# Patient Record
Sex: MALE | Race: WHITE | Employment: OTHER | URBAN - METROPOLITAN AREA
[De-identification: names, ages, dates, MRNs, and addresses within clinical notes are randomized per-mention and may not be internally consistent; named-entity substitution may affect disease eponyms.]

---

## 2018-08-02 NOTE — PAT NURSING NOTE
RN started the health screening with pt, and when asking about medications, pt reports being in the hospital and not quite sure what he is taking.  RN stopped the call and told pt she will call him Monday at [facility] 63 is supposed to be released tomorrow, so t

## 2018-08-06 RX ORDER — METOPROLOL SUCCINATE 25 MG/1
25 TABLET, EXTENDED RELEASE ORAL 2 TIMES DAILY
Status: ON HOLD | COMMUNITY
End: 2018-08-16

## 2018-08-06 RX ORDER — LOPERAMIDE HYDROCHLORIDE 2 MG/1
2 TABLET ORAL 2 TIMES DAILY PRN
COMMUNITY
End: 2018-10-03 | Stop reason: CLARIF

## 2018-08-06 RX ORDER — LISINOPRIL 40 MG/1
40 TABLET ORAL DAILY
COMMUNITY

## 2018-08-06 RX ORDER — AMITRIPTYLINE HYDROCHLORIDE 25 MG/1
25 TABLET, FILM COATED ORAL 2 TIMES DAILY
COMMUNITY

## 2018-08-06 RX ORDER — FLECAINIDE ACETATE 50 MG/1
50 TABLET ORAL 2 TIMES DAILY
Status: ON HOLD | COMMUNITY
End: 2018-08-16

## 2018-08-06 RX ORDER — AMLODIPINE BESYLATE 5 MG/1
5 TABLET ORAL NIGHTLY
COMMUNITY

## 2018-08-06 RX ORDER — TRAMADOL HYDROCHLORIDE 50 MG/1
100 TABLET ORAL 3 TIMES DAILY
Status: ON HOLD | COMMUNITY
End: 2018-08-16

## 2018-08-06 RX ORDER — BACLOFEN 10 MG/1
10 TABLET ORAL DAILY
COMMUNITY

## 2018-08-06 RX ORDER — HYDROCODONE BITARTRATE AND ACETAMINOPHEN 5; 325 MG/1; MG/1
1 TABLET ORAL EVERY 4 HOURS PRN
Status: ON HOLD | COMMUNITY
End: 2018-08-16

## 2018-08-06 RX ORDER — MELATONIN
325
Status: ON HOLD | COMMUNITY
End: 2018-08-18

## 2018-08-06 RX ORDER — CEPHALEXIN 500 MG/1
500 TABLET ORAL 2 TIMES DAILY
Status: ON HOLD | COMMUNITY
End: 2018-08-20

## 2018-08-14 RX ORDER — CEFAZOLIN SODIUM/WATER 2 G/20 ML
2 SYRINGE (ML) INTRAVENOUS ONCE
Status: CANCELLED | OUTPATIENT
Start: 2018-08-14 | End: 2018-08-14

## 2018-08-15 ENCOUNTER — ANESTHESIA EVENT (OUTPATIENT)
Dept: SURGERY | Facility: HOSPITAL | Age: 63
End: 2018-08-15

## 2018-08-15 PROBLEM — T84.59XA INFECTED PROSTHETIC HIP (HCC): Status: ACTIVE | Noted: 2018-08-15

## 2018-08-15 PROBLEM — Z96.649 INFECTED PROSTHETIC HIP (HCC): Status: ACTIVE | Noted: 2018-08-15

## 2018-08-15 NOTE — DISCHARGE SUMMARY
Ortho Discharge Summary     Patient ID:  Kaylee Hatchet  [id number]  70year old  6/28/1955      Admit Date: 8/16/2018    Discharge Date and Time: 8/20/2018    Attending Physician: Yokasta Azevedo MD     Reason for admission: right hip infection    Dischar

## 2018-08-16 ENCOUNTER — APPOINTMENT (OUTPATIENT)
Dept: GENERAL RADIOLOGY | Facility: HOSPITAL | Age: 63
DRG: 465 | End: 2018-08-16
Attending: PHYSICIAN ASSISTANT
Payer: MEDICARE

## 2018-08-16 ENCOUNTER — ANESTHESIA (OUTPATIENT)
Dept: SURGERY | Facility: HOSPITAL | Age: 63
End: 2018-08-16

## 2018-08-16 ENCOUNTER — HOSPITAL ENCOUNTER (INPATIENT)
Facility: HOSPITAL | Age: 63
LOS: 4 days | Discharge: NURSING, REHAB OR LONG-TERM CARE | DRG: 465 | End: 2018-08-20
Attending: ORTHOPAEDIC SURGERY | Admitting: ORTHOPAEDIC SURGERY
Payer: MEDICARE

## 2018-08-16 PROBLEM — I48.0 PAF (PAROXYSMAL ATRIAL FIBRILLATION) (HCC): Status: ACTIVE | Noted: 2018-08-16

## 2018-08-16 PROBLEM — Z86.39 HISTORY OF DIABETES MELLITUS: Status: ACTIVE | Noted: 2018-08-16

## 2018-08-16 PROBLEM — I48.91 ATRIAL FIBRILLATION (HCC): Status: ACTIVE | Noted: 2018-08-16

## 2018-08-16 PROBLEM — T84.51XA INFECTION OF RIGHT PROSTHETIC HIP JOINT (HCC): Status: ACTIVE | Noted: 2018-08-16

## 2018-08-16 PROBLEM — T84.59XA INFECTED PROSTHETIC HIP (HCC): Status: RESOLVED | Noted: 2018-08-15 | Resolved: 2018-08-16

## 2018-08-16 PROBLEM — I10 HTN (HYPERTENSION), BENIGN: Chronic | Status: ACTIVE | Noted: 2018-08-16

## 2018-08-16 PROBLEM — Z96.649 INFECTED PROSTHETIC HIP (HCC): Status: RESOLVED | Noted: 2018-08-15 | Resolved: 2018-08-16

## 2018-08-16 PROBLEM — I82.409 DEEP VEIN THROMBOSIS (HCC): Status: ACTIVE | Noted: 2018-04-01

## 2018-08-16 PROBLEM — I48.91 ATRIAL FIBRILLATION (HCC): Status: RESOLVED | Noted: 2018-08-16 | Resolved: 2018-08-16

## 2018-08-16 PROBLEM — E66.9 OBESITY WITH SERIOUS COMORBIDITY: Status: ACTIVE | Noted: 2018-08-16

## 2018-08-16 PROBLEM — Z98.84 HISTORY OF GASTRIC BYPASS: Status: ACTIVE | Noted: 2018-08-16

## 2018-08-16 LAB
ALBUMIN SERPL-MCNC: 1.8 G/DL (ref 3.5–4.8)
ALBUMIN/GLOB SERPL: 0.6 {RATIO} (ref 1–2)
ALP LIVER SERPL-CCNC: 75 U/L (ref 45–117)
ALT SERPL-CCNC: 7 U/L (ref 17–63)
ANION GAP SERPL CALC-SCNC: 10 MMOL/L (ref 0–18)
ANTIBODY SCREEN: NEGATIVE
AST SERPL-CCNC: 14 U/L (ref 15–41)
ATRIAL RATE: 90 BPM
BASOPHIL SYNOVIAL FLUID: 0 %
BILIRUB SERPL-MCNC: 0.4 MG/DL (ref 0.1–2)
BUN BLD-MCNC: 11 MG/DL (ref 8–20)
BUN/CREAT SERPL: 12 (ref 10–20)
CALCIUM BLD-MCNC: 7.4 MG/DL (ref 8.3–10.3)
CHLORIDE SERPL-SCNC: 103 MMOL/L (ref 101–111)
CO2 SERPL-SCNC: 25 MMOL/L (ref 22–32)
CREAT BLD-MCNC: 0.92 MG/DL (ref 0.7–1.3)
CRYSTALS: NEGATIVE
EOSINOPHIL SYNOVIAL FLUID: 3 %
GLOBULIN PLAS-MCNC: 3.2 G/DL (ref 2.5–4)
GLUCOSE BLD-MCNC: 105 MG/DL (ref 65–99)
GLUCOSE BLD-MCNC: 141 MG/DL (ref 70–99)
LYMPH SYNOVIAL FLUID: 10 %
M PROTEIN MFR SERPL ELPH: 5 G/DL (ref 6.1–8.3)
MON/MAC/HIST SYNOVIAL FLUID: 3 %
NEUTROPHIL SYNOVIAL FLUID: 84 % (ref ?–20)
OSMOLALITY SERPL CALC.SUM OF ELEC: 288 MOSM/KG (ref 275–295)
P AXIS: 98 DEGREES
P-R INTERVAL: 176 MS
POTASSIUM SERPL-SCNC: 3.1 MMOL/L (ref 3.6–5.1)
Q-T INTERVAL: 428 MS
QRS DURATION: 104 MS
QTC CALCULATION (BEZET): 523 MS
R AXIS: -15 DEGREES
RBC SYNOVIAL FLUID: ABNORMAL /MM3
RH BLOOD TYPE: POSITIVE
SODIUM SERPL-SCNC: 138 MMOL/L (ref 136–144)
T AXIS: 16 DEGREES
TOTAL CELLS COUNTED: 100
VENTRICULAR RATE: 90 BPM
WBC SYNOVIAL FLUID: 1320 /MM3 (ref ?–150)

## 2018-08-16 PROCEDURE — 0SP90JZ REMOVAL OF SYNTHETIC SUBSTITUTE FROM RIGHT HIP JOINT, OPEN APPROACH: ICD-10-PCS | Performed by: ORTHOPAEDIC SURGERY

## 2018-08-16 PROCEDURE — 87205 SMEAR GRAM STAIN: CPT | Performed by: ORTHOPAEDIC SURGERY

## 2018-08-16 PROCEDURE — 89060 EXAM SYNOVIAL FLUID CRYSTALS: CPT | Performed by: ORTHOPAEDIC SURGERY

## 2018-08-16 PROCEDURE — 86900 BLOOD TYPING SEROLOGIC ABO: CPT

## 2018-08-16 PROCEDURE — 0SH908Z INSERTION OF SPACER INTO RIGHT HIP JOINT, OPEN APPROACH: ICD-10-PCS | Performed by: ORTHOPAEDIC SURGERY

## 2018-08-16 PROCEDURE — 80053 COMPREHEN METABOLIC PANEL: CPT | Performed by: PHYSICIAN ASSISTANT

## 2018-08-16 PROCEDURE — 82962 GLUCOSE BLOOD TEST: CPT

## 2018-08-16 PROCEDURE — 87070 CULTURE OTHR SPECIMN AEROBIC: CPT | Performed by: ORTHOPAEDIC SURGERY

## 2018-08-16 PROCEDURE — 88331 PATH CONSLTJ SURG 1 BLK 1SPC: CPT | Performed by: ORTHOPAEDIC SURGERY

## 2018-08-16 PROCEDURE — 88108 CYTOPATH CONCENTRATE TECH: CPT | Performed by: ORTHOPAEDIC SURGERY

## 2018-08-16 PROCEDURE — 89050 BODY FLUID CELL COUNT: CPT | Performed by: ORTHOPAEDIC SURGERY

## 2018-08-16 PROCEDURE — 86901 BLOOD TYPING SEROLOGIC RH(D): CPT

## 2018-08-16 PROCEDURE — 93010 ELECTROCARDIOGRAM REPORT: CPT | Performed by: INTERNAL MEDICINE

## 2018-08-16 PROCEDURE — 89051 BODY FLUID CELL COUNT: CPT | Performed by: ORTHOPAEDIC SURGERY

## 2018-08-16 PROCEDURE — 86850 RBC ANTIBODY SCREEN: CPT

## 2018-08-16 PROCEDURE — 88305 TISSUE EXAM BY PATHOLOGIST: CPT | Performed by: ORTHOPAEDIC SURGERY

## 2018-08-16 PROCEDURE — 73501 X-RAY EXAM HIP UNI 1 VIEW: CPT | Performed by: PHYSICIAN ASSISTANT

## 2018-08-16 PROCEDURE — 93005 ELECTROCARDIOGRAM TRACING: CPT

## 2018-08-16 DEVICE — BONE CEMENT HI VISCOSITY R+G: Type: IMPLANTABLE DEVICE | Site: HIP | Status: FUNCTIONAL

## 2018-08-16 RX ORDER — ONDANSETRON 2 MG/ML
4 INJECTION INTRAMUSCULAR; INTRAVENOUS AS NEEDED
Status: DISCONTINUED | OUTPATIENT
Start: 2018-08-16 | End: 2018-08-16 | Stop reason: HOSPADM

## 2018-08-16 RX ORDER — CALCIUM CARBONATE 200(500)MG
500 TABLET,CHEWABLE ORAL
Status: DISCONTINUED | OUTPATIENT
Start: 2018-08-16 | End: 2018-08-20

## 2018-08-16 RX ORDER — SODIUM PHOSPHATE, DIBASIC AND SODIUM PHOSPHATE, MONOBASIC 7; 19 G/133ML; G/133ML
1 ENEMA RECTAL ONCE AS NEEDED
Status: DISCONTINUED | OUTPATIENT
Start: 2018-08-16 | End: 2018-08-20

## 2018-08-16 RX ORDER — GABAPENTIN 300 MG/1
300 CAPSULE ORAL EVERY MORNING
Status: DISCONTINUED | OUTPATIENT
Start: 2018-08-17 | End: 2018-08-20

## 2018-08-16 RX ORDER — MIDAZOLAM HYDROCHLORIDE 1 MG/ML
1 INJECTION INTRAMUSCULAR; INTRAVENOUS EVERY 5 MIN PRN
Status: DISCONTINUED | OUTPATIENT
Start: 2018-08-16 | End: 2018-08-16 | Stop reason: HOSPADM

## 2018-08-16 RX ORDER — OXYCODONE HYDROCHLORIDE 5 MG/1
5 TABLET ORAL EVERY 4 HOURS PRN
Qty: 60 TABLET | Refills: 0 | Status: ON HOLD | OUTPATIENT
Start: 2018-08-16 | End: 2018-10-11

## 2018-08-16 RX ORDER — ACETAMINOPHEN 325 MG/1
TABLET ORAL
Status: COMPLETED
Start: 2018-08-16 | End: 2018-08-16

## 2018-08-16 RX ORDER — PANTOPRAZOLE SODIUM 40 MG/1
40 TABLET, DELAYED RELEASE ORAL
Qty: 30 TABLET | Refills: 0 | Status: SHIPPED | OUTPATIENT
Start: 2018-08-16 | End: 2018-10-03 | Stop reason: CLARIF

## 2018-08-16 RX ORDER — METOPROLOL TARTRATE 50 MG/1
50 TABLET, FILM COATED ORAL 2 TIMES DAILY
COMMUNITY

## 2018-08-16 RX ORDER — DEXTROSE MONOHYDRATE 25 G/50ML
50 INJECTION, SOLUTION INTRAVENOUS
Status: DISCONTINUED | OUTPATIENT
Start: 2018-08-16 | End: 2018-08-16 | Stop reason: HOSPADM

## 2018-08-16 RX ORDER — DOCUSATE SODIUM 100 MG/1
100 CAPSULE, LIQUID FILLED ORAL 2 TIMES DAILY
Status: DISCONTINUED | OUTPATIENT
Start: 2018-08-16 | End: 2018-08-20

## 2018-08-16 RX ORDER — TRAMADOL HYDROCHLORIDE 50 MG/1
TABLET ORAL EVERY 6 HOURS PRN
Status: DISCONTINUED | OUTPATIENT
Start: 2018-08-16 | End: 2018-08-20

## 2018-08-16 RX ORDER — MEPERIDINE HYDROCHLORIDE 25 MG/ML
12.5 INJECTION INTRAMUSCULAR; INTRAVENOUS; SUBCUTANEOUS AS NEEDED
Status: DISCONTINUED | OUTPATIENT
Start: 2018-08-16 | End: 2018-08-16 | Stop reason: HOSPADM

## 2018-08-16 RX ORDER — IBUPROFEN 600 MG/1
600 TABLET ORAL EVERY 8 HOURS PRN
Qty: 90 TABLET | Refills: 2 | Status: SHIPPED | OUTPATIENT
Start: 2018-08-16 | End: 2018-10-03 | Stop reason: CLARIF

## 2018-08-16 RX ORDER — DEXAMETHASONE SODIUM PHOSPHATE 4 MG/ML
8 VIAL (ML) INJECTION AS NEEDED
Status: DISCONTINUED | OUTPATIENT
Start: 2018-08-16 | End: 2018-08-16 | Stop reason: HOSPADM

## 2018-08-16 RX ORDER — AMLODIPINE BESYLATE 5 MG/1
5 TABLET ORAL NIGHTLY
Status: DISCONTINUED | OUTPATIENT
Start: 2018-08-16 | End: 2018-08-20

## 2018-08-16 RX ORDER — GABAPENTIN 300 MG/1
600 CAPSULE ORAL SEE ADMIN INSTRUCTIONS
Status: DISCONTINUED | OUTPATIENT
Start: 2018-08-16 | End: 2018-08-16 | Stop reason: SDUPTHER

## 2018-08-16 RX ORDER — GABAPENTIN 100 MG/1
100 CAPSULE ORAL 3 TIMES DAILY
Status: DISCONTINUED | OUTPATIENT
Start: 2018-08-16 | End: 2018-08-16

## 2018-08-16 RX ORDER — METOPROLOL TARTRATE 50 MG/1
50 TABLET, FILM COATED ORAL
Status: DISCONTINUED | OUTPATIENT
Start: 2018-08-16 | End: 2018-08-20

## 2018-08-16 RX ORDER — CEFAZOLIN SODIUM/WATER 2 G/20 ML
2 SYRINGE (ML) INTRAVENOUS ONCE
Status: COMPLETED | OUTPATIENT
Start: 2018-08-16 | End: 2018-08-16

## 2018-08-16 RX ORDER — CEPHALEXIN 500 MG/1
500 CAPSULE ORAL EVERY 8 HOURS
Status: DISCONTINUED | OUTPATIENT
Start: 2018-08-17 | End: 2018-08-17

## 2018-08-16 RX ORDER — DIPHENHYDRAMINE HCL 25 MG
25 CAPSULE ORAL EVERY 4 HOURS PRN
Status: DISCONTINUED | OUTPATIENT
Start: 2018-08-16 | End: 2018-08-20

## 2018-08-16 RX ORDER — TRAMADOL HYDROCHLORIDE 50 MG/1
TABLET ORAL EVERY 4 HOURS PRN
Qty: 60 TABLET | Refills: 0 | Status: ON HOLD | OUTPATIENT
Start: 2018-08-16 | End: 2018-10-11

## 2018-08-16 RX ORDER — MORPHINE SULFATE 4 MG/ML
2 INJECTION, SOLUTION INTRAMUSCULAR; INTRAVENOUS EVERY 5 MIN PRN
Status: DISCONTINUED | OUTPATIENT
Start: 2018-08-16 | End: 2018-08-16 | Stop reason: HOSPADM

## 2018-08-16 RX ORDER — DEXTROSE MONOHYDRATE 25 G/50ML
50 INJECTION, SOLUTION INTRAVENOUS
Status: DISCONTINUED | OUTPATIENT
Start: 2018-08-16 | End: 2018-08-16

## 2018-08-16 RX ORDER — DIPHENHYDRAMINE HYDROCHLORIDE 50 MG/ML
12.5 INJECTION INTRAMUSCULAR; INTRAVENOUS EVERY 4 HOURS PRN
Status: DISCONTINUED | OUTPATIENT
Start: 2018-08-16 | End: 2018-08-20

## 2018-08-16 RX ORDER — ACETAMINOPHEN 325 MG/1
650 TABLET ORAL EVERY 6 HOURS SCHEDULED
Status: DISCONTINUED | OUTPATIENT
Start: 2018-08-17 | End: 2018-08-20

## 2018-08-16 RX ORDER — ONDANSETRON 4 MG/1
4 TABLET, FILM COATED ORAL EVERY 8 HOURS PRN
Qty: 30 TABLET | Refills: 0 | Status: SHIPPED | OUTPATIENT
Start: 2018-08-16 | End: 2018-10-03 | Stop reason: CLARIF

## 2018-08-16 RX ORDER — WARFARIN SODIUM 5 MG/1
5 TABLET ORAL NIGHTLY
Status: DISCONTINUED | OUTPATIENT
Start: 2018-08-16 | End: 2018-08-17

## 2018-08-16 RX ORDER — ASPIRIN 325 MG
325 TABLET ORAL 2 TIMES DAILY
Status: DISCONTINUED | OUTPATIENT
Start: 2018-08-16 | End: 2018-08-16

## 2018-08-16 RX ORDER — DIPHENHYDRAMINE HYDROCHLORIDE 50 MG/ML
25 INJECTION INTRAMUSCULAR; INTRAVENOUS ONCE AS NEEDED
Status: ACTIVE | OUTPATIENT
Start: 2018-08-16 | End: 2018-08-16

## 2018-08-16 RX ORDER — METOCLOPRAMIDE HYDROCHLORIDE 5 MG/ML
10 INJECTION INTRAMUSCULAR; INTRAVENOUS EVERY 6 HOURS PRN
Status: ACTIVE | OUTPATIENT
Start: 2018-08-16 | End: 2018-08-18

## 2018-08-16 RX ORDER — GABAPENTIN 300 MG/1
600 CAPSULE ORAL SEE ADMIN INSTRUCTIONS
Status: ON HOLD | COMMUNITY
End: 2018-10-11

## 2018-08-16 RX ORDER — NALOXONE HYDROCHLORIDE 0.4 MG/ML
80 INJECTION, SOLUTION INTRAMUSCULAR; INTRAVENOUS; SUBCUTANEOUS AS NEEDED
Status: DISCONTINUED | OUTPATIENT
Start: 2018-08-16 | End: 2018-08-16 | Stop reason: HOSPADM

## 2018-08-16 RX ORDER — AMITRIPTYLINE HYDROCHLORIDE 25 MG/1
25 TABLET, FILM COATED ORAL 2 TIMES DAILY
Status: DISCONTINUED | OUTPATIENT
Start: 2018-08-16 | End: 2018-08-20

## 2018-08-16 RX ORDER — BISACODYL 10 MG
10 SUPPOSITORY, RECTAL RECTAL
Status: DISCONTINUED | OUTPATIENT
Start: 2018-08-16 | End: 2018-08-20

## 2018-08-16 RX ORDER — OXYCODONE HCL 10 MG/1
10 TABLET, FILM COATED, EXTENDED RELEASE ORAL
Status: COMPLETED | OUTPATIENT
Start: 2018-08-16 | End: 2018-08-16

## 2018-08-16 RX ORDER — DOCUSATE SODIUM 100 MG/1
100 CAPSULE, LIQUID FILLED ORAL 2 TIMES DAILY
Qty: 60 CAPSULE | Refills: 2 | Status: SHIPPED | OUTPATIENT
Start: 2018-08-16 | End: 2018-10-03 | Stop reason: CLARIF

## 2018-08-16 RX ORDER — METOCLOPRAMIDE HYDROCHLORIDE 5 MG/ML
10 INJECTION INTRAMUSCULAR; INTRAVENOUS AS NEEDED
Status: DISCONTINUED | OUTPATIENT
Start: 2018-08-16 | End: 2018-08-16 | Stop reason: HOSPADM

## 2018-08-16 RX ORDER — OXYCODONE HYDROCHLORIDE 10 MG/1
10 TABLET ORAL EVERY 4 HOURS PRN
Status: DISCONTINUED | OUTPATIENT
Start: 2018-08-16 | End: 2018-08-20

## 2018-08-16 RX ORDER — TOBRAMYCIN 1.2 G/30ML
INJECTION, POWDER, LYOPHILIZED, FOR SOLUTION INTRAVENOUS AS NEEDED
Status: DISCONTINUED | OUTPATIENT
Start: 2018-08-16 | End: 2018-08-16 | Stop reason: HOSPADM

## 2018-08-16 RX ORDER — OXYCODONE HYDROCHLORIDE 5 MG/1
5 TABLET ORAL EVERY 4 HOURS PRN
Status: DISCONTINUED | OUTPATIENT
Start: 2018-08-16 | End: 2018-08-20

## 2018-08-16 RX ORDER — GABAPENTIN 300 MG/1
600 CAPSULE ORAL NIGHTLY
Status: ON HOLD | COMMUNITY
End: 2018-10-11

## 2018-08-16 RX ORDER — SCOLOPAMINE TRANSDERMAL SYSTEM 1 MG/1
1 PATCH, EXTENDED RELEASE TRANSDERMAL ONCE
Status: COMPLETED | OUTPATIENT
Start: 2018-08-16 | End: 2018-08-19

## 2018-08-16 RX ORDER — ACETAMINOPHEN 500 MG
1000 TABLET ORAL ONCE
Status: DISCONTINUED | OUTPATIENT
Start: 2018-08-16 | End: 2018-08-16 | Stop reason: HOSPADM

## 2018-08-16 RX ORDER — GABAPENTIN 300 MG/1
300 CAPSULE ORAL EVERY MORNING
COMMUNITY

## 2018-08-16 RX ORDER — GABAPENTIN 300 MG/1
600 CAPSULE ORAL 2 TIMES DAILY
Status: DISCONTINUED | OUTPATIENT
Start: 2018-08-16 | End: 2018-08-20

## 2018-08-16 RX ORDER — ONDANSETRON 2 MG/ML
4 INJECTION INTRAMUSCULAR; INTRAVENOUS EVERY 4 HOURS PRN
Status: ACTIVE | OUTPATIENT
Start: 2018-08-16 | End: 2018-08-18

## 2018-08-16 RX ORDER — MELATONIN
325
Status: DISCONTINUED | OUTPATIENT
Start: 2018-08-17 | End: 2018-08-20

## 2018-08-16 RX ORDER — CHOLECALCIFEROL (VITAMIN D3) 125 MCG
500 CAPSULE ORAL DAILY
Status: DISCONTINUED | OUTPATIENT
Start: 2018-08-17 | End: 2018-08-20

## 2018-08-16 RX ORDER — ASPIRIN 325 MG
325 TABLET, DELAYED RELEASE (ENTERIC COATED) ORAL 2 TIMES DAILY
Qty: 60 TABLET | Refills: 0 | Status: SHIPPED | OUTPATIENT
Start: 2018-08-16 | End: 2018-08-18

## 2018-08-16 RX ORDER — ENOXAPARIN SODIUM 100 MG/ML
30 INJECTION SUBCUTANEOUS EVERY 12 HOURS SCHEDULED
Status: DISCONTINUED | OUTPATIENT
Start: 2018-08-17 | End: 2018-08-17

## 2018-08-16 RX ORDER — LISINOPRIL 40 MG/1
40 TABLET ORAL DAILY
Status: DISCONTINUED | OUTPATIENT
Start: 2018-08-17 | End: 2018-08-20

## 2018-08-16 RX ORDER — SODIUM CHLORIDE, SODIUM LACTATE, POTASSIUM CHLORIDE, CALCIUM CHLORIDE 600; 310; 30; 20 MG/100ML; MG/100ML; MG/100ML; MG/100ML
INJECTION, SOLUTION INTRAVENOUS CONTINUOUS
Status: DISCONTINUED | OUTPATIENT
Start: 2018-08-16 | End: 2018-08-16 | Stop reason: HOSPADM

## 2018-08-16 RX ORDER — POLYETHYLENE GLYCOL 3350 17 G/17G
17 POWDER, FOR SOLUTION ORAL DAILY PRN
Status: DISCONTINUED | OUTPATIENT
Start: 2018-08-16 | End: 2018-08-20

## 2018-08-16 RX ORDER — ACETAMINOPHEN 325 MG/1
650 TABLET ORAL ONCE
Status: COMPLETED | OUTPATIENT
Start: 2018-08-16 | End: 2018-08-16

## 2018-08-16 RX ORDER — SODIUM CHLORIDE, SODIUM LACTATE, POTASSIUM CHLORIDE, CALCIUM CHLORIDE 600; 310; 30; 20 MG/100ML; MG/100ML; MG/100ML; MG/100ML
INJECTION, SOLUTION INTRAVENOUS CONTINUOUS
Status: DISCONTINUED | OUTPATIENT
Start: 2018-08-16 | End: 2018-08-17

## 2018-08-16 RX ORDER — SODIUM CHLORIDE, SODIUM LACTATE, POTASSIUM CHLORIDE, CALCIUM CHLORIDE 600; 310; 30; 20 MG/100ML; MG/100ML; MG/100ML; MG/100ML
INJECTION, SOLUTION INTRAVENOUS CONTINUOUS
Status: DISCONTINUED | OUTPATIENT
Start: 2018-08-16 | End: 2018-08-20

## 2018-08-16 NOTE — H&P
History and Physical: Brief Update    I have reviewed the history and physical performed within the last 30 days. I agree with this assessment and have no further additions. The consent form is signed and present in the chart.   The patient desires to pro

## 2018-08-16 NOTE — ANESTHESIA PREPROCEDURE EVALUATION
PRE-OP EVALUATION    Patient Name: Cee Dumont    Pre-op Diagnosis: INFECTION OF RIGHT HIP      Procedure(s):  RIGHT HIP RESECTION WITH INSERTION OF ANTIBIOTIC SPACER       Surgeon(s) and Role:      Toi Parker MD - Primary    Pre-op vitals review (eight) hours as needed for Nausea. Disp: 30 tablet Rfl: 0   OxyCODONE HCl 5 MG Oral Tab Take 1 tablet (5 mg total) by mouth every 4 (four) hours as needed for Pain.  Disp: 60 tablet Rfl: 0   Pantoprazole Sodium 40 MG Oral Tab EC Take 1 tablet (40 mg total) tach.\")                   Endo/Other      (+) diabetes and well controlled, type 2, not using insulin                         Pulmonary                    (+) sleep apnea and no treatment      Neuro/Psych                 (+) neuromuscular disease (periphe risks of general anesthesia, including nausea, dental damage, sore throat, mouth injury,and hoarseness from airway management. All questions were answered and understanding was demonstrated of risks.   Informed permission was obtained to proceed as documen

## 2018-08-17 PROBLEM — E87.6 HYPOKALEMIA: Status: ACTIVE | Noted: 2018-08-17

## 2018-08-17 LAB
ALBUMIN SERPL-MCNC: 1.8 G/DL (ref 3.5–4.8)
ALBUMIN/GLOB SERPL: 0.6 {RATIO} (ref 1–2)
ALP LIVER SERPL-CCNC: 72 U/L (ref 45–117)
ALT SERPL-CCNC: 9 U/L (ref 17–63)
ANION GAP SERPL CALC-SCNC: 9 MMOL/L (ref 0–18)
AST SERPL-CCNC: 11 U/L (ref 15–41)
BILIRUB SERPL-MCNC: 0.2 MG/DL (ref 0.1–2)
BUN BLD-MCNC: 14 MG/DL (ref 8–20)
BUN/CREAT SERPL: 12.6 (ref 10–20)
CALCIUM BLD-MCNC: 7.4 MG/DL (ref 8.3–10.3)
CHLORIDE SERPL-SCNC: 102 MMOL/L (ref 101–111)
CO2 SERPL-SCNC: 26 MMOL/L (ref 22–32)
CREAT BLD-MCNC: 1.11 MG/DL (ref 0.7–1.3)
ERYTHROCYTE [DISTWIDTH] IN BLOOD BY AUTOMATED COUNT: 14.2 % (ref 11.5–16)
EST. AVERAGE GLUCOSE BLD GHB EST-MCNC: 105 MG/DL (ref 68–126)
GLOBULIN PLAS-MCNC: 3.1 G/DL (ref 2.5–4)
GLUCOSE BLD-MCNC: 172 MG/DL (ref 70–99)
HBA1C MFR BLD HPLC: 5.3 % (ref ?–5.7)
HCT VFR BLD AUTO: 30.7 % (ref 37–53)
HGB BLD-MCNC: 9.7 G/DL (ref 13–17)
INR BLD: 1.16 (ref 0.9–1.1)
M PROTEIN MFR SERPL ELPH: 4.9 G/DL (ref 6.1–8.3)
MCH RBC QN AUTO: 27.2 PG (ref 27–33.2)
MCHC RBC AUTO-ENTMCNC: 31.6 G/DL (ref 31–37)
MCV RBC AUTO: 86 FL (ref 80–99)
NON GYNE INTERPRETATION: NEGATIVE
OSMOLALITY SERPL CALC.SUM OF ELEC: 289 MOSM/KG (ref 275–295)
PLATELET # BLD AUTO: 311 10(3)UL (ref 150–450)
POTASSIUM SERPL-SCNC: 3 MMOL/L (ref 3.6–5.1)
PSA SERPL DL<=0.01 NG/ML-MCNC: 15.3 SECONDS (ref 12.4–14.7)
RBC # BLD AUTO: 3.57 X10(6)UL (ref 3.8–5.8)
RED CELL DISTRIBUTION WIDTH-SD: 44.5 FL (ref 35.1–46.3)
SODIUM SERPL-SCNC: 137 MMOL/L (ref 136–144)
TSI SER-ACNC: 0.58 MIU/ML (ref 0.35–5.5)
WBC # BLD AUTO: 11.8 X10(3) UL (ref 4–13)

## 2018-08-17 PROCEDURE — B54MZZA ULTRASONOGRAPHY OF RIGHT UPPER EXTREMITY VEINS, GUIDANCE: ICD-10-PCS | Performed by: ORTHOPAEDIC SURGERY

## 2018-08-17 PROCEDURE — 83036 HEMOGLOBIN GLYCOSYLATED A1C: CPT | Performed by: FAMILY MEDICINE

## 2018-08-17 PROCEDURE — 84443 ASSAY THYROID STIM HORMONE: CPT | Performed by: FAMILY MEDICINE

## 2018-08-17 PROCEDURE — 80053 COMPREHEN METABOLIC PANEL: CPT | Performed by: FAMILY MEDICINE

## 2018-08-17 PROCEDURE — 97150 GROUP THERAPEUTIC PROCEDURES: CPT

## 2018-08-17 PROCEDURE — 97165 OT EVAL LOW COMPLEX 30 MIN: CPT

## 2018-08-17 PROCEDURE — 97162 PT EVAL MOD COMPLEX 30 MIN: CPT

## 2018-08-17 PROCEDURE — 97116 GAIT TRAINING THERAPY: CPT

## 2018-08-17 PROCEDURE — 85027 COMPLETE CBC AUTOMATED: CPT | Performed by: PHYSICIAN ASSISTANT

## 2018-08-17 PROCEDURE — 97535 SELF CARE MNGMENT TRAINING: CPT

## 2018-08-17 PROCEDURE — 85610 PROTHROMBIN TIME: CPT | Performed by: FAMILY MEDICINE

## 2018-08-17 PROCEDURE — 02HV33Z INSERTION OF INFUSION DEVICE INTO SUPERIOR VENA CAVA, PERCUTANEOUS APPROACH: ICD-10-PCS | Performed by: ORTHOPAEDIC SURGERY

## 2018-08-17 PROCEDURE — 36569 INSJ PICC 5 YR+ W/O IMAGING: CPT

## 2018-08-17 PROCEDURE — 76937 US GUIDE VASCULAR ACCESS: CPT

## 2018-08-17 RX ORDER — ALFUZOSIN HYDROCHLORIDE 10 MG/1
10 TABLET, EXTENDED RELEASE ORAL
Status: DISCONTINUED | OUTPATIENT
Start: 2018-08-17 | End: 2018-08-20

## 2018-08-17 RX ORDER — POTASSIUM CHLORIDE 20 MEQ/1
40 TABLET, EXTENDED RELEASE ORAL EVERY 4 HOURS
Status: COMPLETED | OUTPATIENT
Start: 2018-08-17 | End: 2018-08-17

## 2018-08-17 RX ORDER — ALFUZOSIN HYDROCHLORIDE 10 MG/1
10 TABLET, EXTENDED RELEASE ORAL
Status: DISCONTINUED | OUTPATIENT
Start: 2018-08-18 | End: 2018-08-17

## 2018-08-17 RX ORDER — HYDROMORPHONE HYDROCHLORIDE 1 MG/ML
0.5 INJECTION, SOLUTION INTRAMUSCULAR; INTRAVENOUS; SUBCUTANEOUS EVERY 8 HOURS PRN
Status: DISCONTINUED | OUTPATIENT
Start: 2018-08-17 | End: 2018-08-20

## 2018-08-17 RX ORDER — SODIUM CHLORIDE 0.9 % (FLUSH) 0.9 %
10 SYRINGE (ML) INJECTION AS NEEDED
Status: DISCONTINUED | OUTPATIENT
Start: 2018-08-17 | End: 2018-08-20

## 2018-08-17 NOTE — CONSULTS
INFECTIOUS DISEASE CONSULTATION    Dodie Hu Patient Status:  Observation    1955 MRN [id number]   [facility] 3SW-A Attending Davide Russell MD   Hosp Day # 0 PCP PHYSICIAN NON khai-stefany says fine now   • Syncope     for over 9 years-episode lasts for 5 minutes to 5 hours- amnesia from the 3-4 hours prior to each episode     Past Surgical History:  2013: CHOLECYSTECTOMY      Comment: with EGD and hernia repair at the same time  0 PRN  •  bisacodyl (DULCOLAX) rectal suppository 10 mg, 10 mg, Rectal, Daily PRN  •  FLEET ENEMA (FLEET) 7-19 GM/118ML enema 133 mL, 1 enema, Rectal, Once PRN  •  ondansetron HCl (ZOFRAN) injection 4 mg, 4 mg, Intravenous, Q4H PRN  •  Metoclopramide HCl (RE Systems:    A comprehensive 10 point review of systems was completed. Pertinent positives and negatives noted in the the HPI. Physical Exam:    General: No acute distress. Alert and oriented x 3.   Vital signs: Temp:  [97.6 °F (36.4 °C)-101.1 °F (38.4 hip revision 0/67/86  Complicated by Pseudomonas infection (per patient) sp I/D  --has been off abx 2 weeks ago    SP right hip revision resection 8/16 with spacer, and vanc-tobra beads  -cell count not significantly elevated, mostly hemmoragic appearing

## 2018-08-17 NOTE — PHYSICAL THERAPY NOTE
PHYSICAL THERAPY TREATMENT NOTE - INPATIENT     Room Number: 976/017-C     Session: 1  Number of Visits to Meet Established Goals: 3    Presenting Problem: s/p R hip resection with antibiotic spacer placement 8/17    History related to current admission: surgery- with hardware  No date: OTHER Right      Comment: hardware removal from foot-drain placed  2004: OTHER Right      Comment: radius and ulnar repair-hardware  1990: OTHER      Comment: Triple U surgery for ROSA- with tonsillectomy  03/2018: OTHER Rig Decreased stance time (TDWB on RLE, cues needed to maintain precautions)          Skilled Therapy Provided: The pt performed recommended exercises within pain free range. Ex modified. Pt ambulated 48' with RW and TTWB with cga/sup.  Pt has difficulty mainta feet with assistive device at assistance level: modified independent   Modified to ambulation 50' with RW and mod ind.    Goal #4     Patient will negotiate 4 stairs/one curb w/ assistive device and supervision  Modified to one stoop with RW and mod ind   G

## 2018-08-17 NOTE — OCCUPATIONAL THERAPY NOTE
OCCUPATIONAL THERAPY EVALUATION - INPATIENT     Room Number: 985/495-Q  Evaluation Date: 8/17/2018  Type of Evaluation: Initial  Presenting Problem: s/p R hip resection with antibiotic spacer insertion 8/16/18    Physician Order: IP Consult to Occupational CHOLECYSTECTOMY      Comment: with EGD and hernia repair at the same time  01/24/2018: HIP REPLACEMENT SURGERY Right  06/13/2018: HIP REPLACEMENT SURGERY Right      Comment: 2nd time  2006?: KNEE REPLACEMENT SURGERY Right  No date: OTHER      Comment: tete ASSESSMENT  Upper extremity ROM is within functional limits except for the following:  Left Shoulder flexion 50-75%, reports baseline but functional    Upper extremity strength is within functional limits except for the following;  Left Shoulder flexion demo, and increased time; sitting in chair via min assist to control descent. Patient also educated on OT role, safety, fall prevention, pain management with good verbal understanding.    Patient End of Session: Up in chair;Needs met;Call light within reach treatment options    Overall Complexity LOW     OT Discharge Recommendations: Skilled nursing facility  OT Device Recommendations: None    PLAN  OT Treatment Plan: Balance activities; ADL training;Functional transfer training; Endurance training;Patient/Fami

## 2018-08-17 NOTE — PROGRESS NOTES
[facility]    Progress Note    Ross Rios Patient Status:  Observation    1955 MRN [id number]   [facility] 3SW-A Attending Nay Mendenhall MD   Hosp Day # 0 PCP PHYSICIAN NONSTAFF       Subjective:   Ross Rios is a(n) [facility])  History of dvt in 4/18 treated with xarelto      HTN (hypertension), benign        Neuropathy        History of gastric bypass        Obesity with serious comorbidity        PAF (paroxysmal atrial fibrillation) (HCC)            Postoperative Recomme

## 2018-08-17 NOTE — PLAN OF CARE
HEMATOLOGIC - ADULT    • Maintains hematologic stability Progressing        PAIN - ADULT    • Verbalizes/displays adequate comfort level or patient's stated pain goal Progressing        Patient/Family Goals    • Patient/Family Long Term Goal Progressing

## 2018-08-17 NOTE — PROGRESS NOTES
[facility]    Progress Note    Sully Arenas Patient Status:  Inpatient    1955 MRN [id number]   [facility] 3SW-A Attending Chacha Moss MD   [facility] Day # 1 PCP PHYSICIAN NONSTAFF       Subjective:   Sully Aernas is a(n) 70 abnormality Prolonged QT interval or tu fusion, consider myocardial disease, electrolyte imbalance, or drug effects Abnormal ECG No previous ECGs available Confirmed by REN ROCA AMAN ([id number]) on 8/16/2018 11:05:27 PM      Assessment and Plan:     Infection

## 2018-08-17 NOTE — RESPIRATORY THERAPY NOTE
ROSA - Equipment Use Daily Summary:                  . Set Mode:                . Usage in hours:                . 90% Pressure (EPAP) level:                . 90% Insp. Pressure (IPAP): [place] [facility]:                .  Supplemental Oxygen:    LPM

## 2018-08-17 NOTE — ANESTHESIA POSTPROCEDURE EVALUATION
[address] [place] [address] Patient Status:  Surgery Admit   Age/Gender 70year old male MRN [id number]   [facility] Attending Asiya Pichardo MD   Hosp Day # 0 PCP PHYSICIAN NONSTAFF       Anesthesia Post-op Note    Procedure(s

## 2018-08-17 NOTE — PHYSICAL THERAPY NOTE
PHYSICAL THERAPY HIP EVALUATION - INPATIENT     Room Number: 403/055-O  Evaluation Date: 8/17/2018  Type of Evaluation: Initial  Physician Order: PT Eval and Treat    Presenting Problem: s/p R hip resection with antibiotic spacer placement 8/17  Reason for OTHER      Comment: gastric bypass  No date: OTHER Right      Comment: hammer toe surgery- with hardware  No date: OTHER Right      Comment: hardware removal from foot-drain placed  2004: OTHER Right      Comment: radius and ulnar repair-hardware  1990: OT ACTIVITY TOLERANCE  O2 Saturation: 91%  Room air  No shortness of breath    AM-PAC '6-Clicks' INPATIENT SHORT FORM - BASIC MOBILITY  How much difficulty does the patient currently have. ..  -   Turning over in bed (including adjusting bedclothes, she include h/o THR with infection and subsequent resection. In this PT evaluation, the patient presents with the following impairments decreased activity tolerance. Functional outcome measures completed include AMPAC.   Based on this evaluation, patient's cl

## 2018-08-18 LAB
ANION GAP SERPL CALC-SCNC: 8 MMOL/L (ref 0–18)
BUN BLD-MCNC: 18 MG/DL (ref 8–20)
BUN/CREAT SERPL: 18.4 (ref 10–20)
CALCIUM BLD-MCNC: 7.9 MG/DL (ref 8.3–10.3)
CHLORIDE SERPL-SCNC: 102 MMOL/L (ref 101–111)
CO2 SERPL-SCNC: 27 MMOL/L (ref 22–32)
CREAT BLD-MCNC: 0.98 MG/DL (ref 0.7–1.3)
ERYTHROCYTE [DISTWIDTH] IN BLOOD BY AUTOMATED COUNT: 14.4 % (ref 11.5–16)
GLUCOSE BLD-MCNC: 114 MG/DL (ref 70–99)
HCT VFR BLD AUTO: 26.6 % (ref 37–53)
HGB BLD-MCNC: 8.6 G/DL (ref 13–17)
MCH RBC QN AUTO: 27.4 PG (ref 27–33.2)
MCHC RBC AUTO-ENTMCNC: 32.3 G/DL (ref 31–37)
MCV RBC AUTO: 84.7 FL (ref 80–99)
OSMOLALITY SERPL CALC.SUM OF ELEC: 287 MOSM/KG (ref 275–295)
PLATELET # BLD AUTO: 312 10(3)UL (ref 150–450)
POTASSIUM SERPL-SCNC: 3.7 MMOL/L (ref 3.6–5.1)
POTASSIUM SERPL-SCNC: 3.7 MMOL/L (ref 3.6–5.1)
RBC # BLD AUTO: 3.14 X10(6)UL (ref 3.8–5.8)
RED CELL DISTRIBUTION WIDTH-SD: 44.5 FL (ref 35.1–46.3)
SODIUM SERPL-SCNC: 137 MMOL/L (ref 136–144)
WBC # BLD AUTO: 13.1 X10(3) UL (ref 4–13)

## 2018-08-18 PROCEDURE — 97116 GAIT TRAINING THERAPY: CPT

## 2018-08-18 PROCEDURE — 97110 THERAPEUTIC EXERCISES: CPT

## 2018-08-18 PROCEDURE — 97535 SELF CARE MNGMENT TRAINING: CPT

## 2018-08-18 PROCEDURE — 85027 COMPLETE CBC AUTOMATED: CPT | Performed by: PHYSICIAN ASSISTANT

## 2018-08-18 PROCEDURE — 80048 BASIC METABOLIC PNL TOTAL CA: CPT | Performed by: FAMILY MEDICINE

## 2018-08-18 PROCEDURE — 84132 ASSAY OF SERUM POTASSIUM: CPT | Performed by: FAMILY MEDICINE

## 2018-08-18 RX ORDER — MELATONIN
325 2 TIMES DAILY WITH MEALS
Qty: 60 TABLET | Refills: 2 | Status: SHIPPED | OUTPATIENT
Start: 2018-08-18

## 2018-08-18 NOTE — PROGRESS NOTES
[facility]    Progress Note    Florencia Seat Patient Status:  Inpatient    1955 MRN [id number]   [facility] 3SW-A Attending Mirna Sterling MD   [facility] Day # 2 PCP PHYSICIAN NONSTAFF       Subjective:   Florencia Seat is a(n) Ralf Egan hip joint ([address]  Sp resection and spacer placed.  IV antibiotics for 6 - 8 weeks      Deep vein thrombosis (HCC)  History of dvt four months ago      HTN (hypertension), benign        Neuropathy        History of gastric bypass        Obesity with serious c

## 2018-08-18 NOTE — PHYSICAL THERAPY NOTE
PHYSICAL THERAPY TREATMENT NOTE - INPATIENT    Room Number: 152/001-K     Session: POD 3  Number of Visits to Meet Established Goals: 3    Presenting Problem: s/p R hip resection with antibiotic spacer placement 8/17    Problem List  Active Problems:    I Right      Comment: aspiration of hip joint    SUBJECTIVE  \"I've been through this before. \"     Patient’s self-stated goal is to feel better.     OBJECTIVE  Precautions: DONA - posterior (+no active ABD and TTWB on R LE)    WEIGHT BEARING RESTRICTION  Baljeet RW upon arrival with no assistance. Pt urinated on shorts and requested changed of clothes. Once seated, light blood was observed on bed sheets and RN was called in to assess. RN changed dressing while in room.   Pt was then assisted with donning clean pa feet with assistive device at assistance level: modified independent   Modified to ambulation 50' with RW and mod ind.    Goal #4     Patient will negotiate 4 stairs/one curb w/ assistive device and supervision  Modified to one stoop with RW and mod ind   G

## 2018-08-18 NOTE — OCCUPATIONAL THERAPY NOTE
OCCUPATIONAL THERAPY TREATMENT NOTE - INPATIENT     Room Number: 963/730-L  Session: 1   Number of Visits to Meet Established Goals: 2    Presenting Problem: s/p R hip resection with antibiotic spacer insertion 8/16/18    History related to current admissi REPLACEMENT SURGERY Right  06/13/2018: HIP REPLACEMENT SURGERY Right      Comment: 2nd time  2006?: KNEE REPLACEMENT SURGERY Right  No date: OTHER      Comment: wisdom teeth  2013: OTHER      Comment: gastric bypass  No date: OTHER Right      Comment: amor and then able to pull up. Pt supervision for all other ADLs. Pt ambulated  X 20 feet prior to UE fatigue. Pt back in chair upon end of session  Patient End of Session: Up in chair;Needs met;Call light within reach;RN aware of session/findings; All patient q

## 2018-08-19 LAB
ERYTHROCYTE [DISTWIDTH] IN BLOOD BY AUTOMATED COUNT: 14.6 % (ref 11.5–16)
HCT VFR BLD AUTO: 26.6 % (ref 37–53)
HGB BLD-MCNC: 8.6 G/DL (ref 13–17)
MCH RBC QN AUTO: 27.7 PG (ref 27–33.2)
MCHC RBC AUTO-ENTMCNC: 32.3 G/DL (ref 31–37)
MCV RBC AUTO: 85.8 FL (ref 80–99)
PLATELET # BLD AUTO: 301 10(3)UL (ref 150–450)
RBC # BLD AUTO: 3.1 X10(6)UL (ref 3.8–5.8)
RED CELL DISTRIBUTION WIDTH-SD: 45.5 FL (ref 35.1–46.3)
WBC # BLD AUTO: 8.2 X10(3) UL (ref 4–13)

## 2018-08-19 PROCEDURE — 97150 GROUP THERAPEUTIC PROCEDURES: CPT

## 2018-08-19 PROCEDURE — 85027 COMPLETE CBC AUTOMATED: CPT | Performed by: PHYSICIAN ASSISTANT

## 2018-08-19 NOTE — PHYSICAL THERAPY NOTE
PHYSICAL THERAPY TREATMENT NOTE - INPATIENT    Room Number: 983/182-E     Session: 4  Number of Visits to Meet Established Goals: 3 two sessions added.        Presenting Problem: s/p R hip resection with antibiotic spacer placement 8/17    Problem List  Ac out  05/30/2018: OTHER Right      Comment: aspiration of hip joint    SUBJECTIVE  \"I am very tired today, I feel like I don't have any strength. \"    Patient’s self-stated goal is to feel better.     OBJECTIVE  Precautions: DONA - posterior (+no active ABD supervision with cues. Pt worked on maintaining TDWB on RLE during sit to stand with cues to adhere to precautions. Pt ambulated with RW, 25' with sup, noted that pt was placing increased wt on RLE, max cues provided to maintain WB restriction.  Pt very eas verbalizes and/or demonstrates all precautions and safety concerns independently   Goal #6           Goal Comments: Goals established on 8/17/2018.  Ongoing 8/19/18

## 2018-08-19 NOTE — PLAN OF CARE
DISCHARGE PLANNING    • Discharge to home or other facility with appropriate resources Progressing        POC explained to the patient and wife, verbalized understanding    Pain is well managed by Oxycodone PRN.  Dressing was changed early this am, intact a

## 2018-08-19 NOTE — PROGRESS NOTES
[facility]    Progress Note    Steve Taveramario Patient Status:  Inpatient    1955 MRN [id number]   [facility] 3SW-A Attending Chantelle Stephens MD   [facility] Day # 3 PCP PHYSICIAN NONSTAFF       Subjective:   tSeve Ana is a(n) 70 serious comorbidity        PAF (paroxysmal atrial fibrillation) (HCC)        Hypokalemia  resolved          Postoperative Recommendations:      Anticoagulation / DVT prophylaxis: SCDs, eliquis 5 mg bid  GI protection: Protonix  Incentive Spirometry   O2 as

## 2018-08-20 VITALS
BODY MASS INDEX: 34.95 KG/M2 | SYSTOLIC BLOOD PRESSURE: 135 MMHG | TEMPERATURE: 98 F | HEART RATE: 91 BPM | OXYGEN SATURATION: 97 % | HEIGHT: 72 IN | WEIGHT: 258.06 LBS | RESPIRATION RATE: 18 BRPM | DIASTOLIC BLOOD PRESSURE: 59 MMHG

## 2018-08-20 LAB — TOBRAMYCIN SERPL-MCNC: <0.3 UG/ML

## 2018-08-20 PROCEDURE — 80200 ASSAY OF TOBRAMYCIN: CPT | Performed by: INTERNAL MEDICINE

## 2018-08-20 PROCEDURE — 97150 GROUP THERAPEUTIC PROCEDURES: CPT

## 2018-08-20 RX ORDER — MEROPENEM 1 G/1
1 INJECTION, POWDER, FOR SOLUTION INTRAVENOUS EVERY 8 HOURS
Qty: 138 G | Refills: 0 | Status: SHIPPED | OUTPATIENT
Start: 2018-08-20 | End: 2018-10-03 | Stop reason: CLARIF

## 2018-08-20 NOTE — PLAN OF CARE
DISCHARGE PLANNING    • Discharge to home or other facility with appropriate resources Adequate for Discharge        HEMATOLOGIC - ADULT    • Maintains hematologic stability Adequate for Discharge        Impaired Activities of Daily Living    • Achieve hig

## 2018-08-20 NOTE — OPERATIVE REPORT
[facility]    PATIENT'S NAME: Loralie Goldberg   ATTENDING PHYSICIAN: Karina Solano. MD Shala   OPERATING PHYSICIAN: Karina Solano.  MD Shala   PATIENT ACCOUNT#:   [id number]    LOCATION:  3SW-A [facility]  MEDICAL RECORD #:   [id number]       DATE OF BIRTH:  0 the above-stated procedure, he was brought to the operating room suite and the above-stated procedure was confirmed. Antibiotics were withheld until definitive cultures were obtained. A general anesthetic was then administered.   A Ch catheter was plac copiously irrigated with normal saline via pulse lavage. In total, 9 L of fluid were utilized. At this point a Palacos cement with gentamicin was mixed, adding 3 additional grams of vancomycin and 1 g of tobramycin per batch.   One batch was mixed and an

## 2018-08-20 NOTE — PROGRESS NOTES
Report called to Schofield RN at [facility]. Rx sent with pt for pain medications and atbx.

## 2018-08-20 NOTE — PLAN OF CARE
DISCHARGE PLANNING    • Discharge to home or other facility with appropriate resources Progressing        HEMATOLOGIC - ADULT    • Maintains hematologic stability Progressing        Impaired Activities of Daily Living    • Achieve highest/safest level of i

## 2018-08-20 NOTE — PROGRESS NOTES
[facility]                INFECTIOUS DISEASE PROGRESS NOTE    Gaston Sauer Patient Status:  Inpatient    1955 MRN [id number]   [facility] 3SW-A Attending Marbella Moreno MD   [address] Day # 4 PCP PHYSICIAN NONSTAFF     Antibio Fluid Culture Result No Growth 3 Days    Body Fld Smear 4+ WBCs seen     No organisms seen     This is a cytocentrifuged smear.     Comment: Original smear performed by [facility]               Problem list reviewed:  Patient Active Problem

## 2018-08-20 NOTE — PHYSICAL THERAPY NOTE
PHYSICAL THERAPY HIP TREATMENT NOTE - INPATIENT      Room Number: 820/504-X     Session: 5  Number of Visits to Meet Established Goals: 3 (two session added)    Presenting Problem: s/p R hip resection with antibiotic spacer placement 8/17  History related Comment: hammer toe surgery- with hardware  No date: OTHER Right      Comment: hardware removal from foot-drain placed  2004: OTHER Right      Comment: radius and ulnar repair-hardware  1990: OTHER      Comment: Triple U surgery for ROSA- with tonsillectomy (Max cueing to maintain TDWB on R LE)  Stoop/Curb Assistance: Not tested       Skilled Therapy Provided: RN approved session. Pt was seen in group in am. Pt performed exercises with supervision with cues.  Pt worked on maintaining TDWB on RLE during sit to Goal #3     Patient is able to ambulate 300 feet with assistive device at assistance level: modified independent   Modified to ambulation 50' with RW and mod ind.    Goal #4     Patient will negotiate 4 stairs/one curb w/ assistive device and supervision

## 2018-08-20 NOTE — PROGRESS NOTES
[facility]    Ortho Medical Progress Note     Griselda Anton Patient Status:  Inpatient    1955 MRN [id number]   [facility] 3SW-A Attending Maida Le MD   [facility] Day # 4 PCP PHYSICIAN NONSTAFF       Subjective:   lEian Amos 08/18/2018   K 3.7 08/18/2018    08/18/2018   CO2 27.0 08/18/2018    (H) 08/18/2018   CA 7.9 (L) 08/18/2018   ALB 1.8 (L) 08/17/2018   ALKPHO 72 08/17/2018   BILT 0.2 08/17/2018   TP 4.9 (L) 08/17/2018   AST 11 (L) 08/17/2018   ALT 9 (L) 08/17

## 2018-10-08 NOTE — DISCHARGE SUMMARY
Ortho Discharge Summary     Patient ID:  Lida Muhammad  [id number]  70year old  6/28/1955      Admit Date: 10/11/2018    Discharge Date and Time: 10/12/2018    Attending Physician: Josef Crowder MD     Reason for admission: right hip infection    Disch

## 2018-10-11 ENCOUNTER — APPOINTMENT (OUTPATIENT)
Dept: GENERAL RADIOLOGY | Facility: HOSPITAL | Age: 63
DRG: 481 | End: 2018-10-11
Attending: ORTHOPAEDIC SURGERY
Payer: MEDICARE

## 2018-10-11 ENCOUNTER — APPOINTMENT (OUTPATIENT)
Dept: GENERAL RADIOLOGY | Facility: HOSPITAL | Age: 63
DRG: 481 | End: 2018-10-11
Attending: PHYSICIAN ASSISTANT
Payer: MEDICARE

## 2018-10-11 ENCOUNTER — ANESTHESIA EVENT (OUTPATIENT)
Dept: SURGERY | Facility: HOSPITAL | Age: 63
DRG: 481 | End: 2018-10-11
Payer: MEDICARE

## 2018-10-11 ENCOUNTER — HOSPITAL ENCOUNTER (INPATIENT)
Facility: HOSPITAL | Age: 63
LOS: 1 days | Discharge: HOME HEALTH | DRG: 481 | End: 2018-10-12
Attending: ORTHOPAEDIC SURGERY | Admitting: ORTHOPAEDIC SURGERY
Payer: MEDICARE

## 2018-10-11 ENCOUNTER — ANESTHESIA (OUTPATIENT)
Dept: SURGERY | Facility: HOSPITAL | Age: 63
DRG: 481 | End: 2018-10-11
Payer: MEDICARE

## 2018-10-11 PROBLEM — Z96.649 S/P REVISION OF TOTAL HIP: Status: ACTIVE | Noted: 2018-10-11

## 2018-10-11 PROBLEM — D62 POSTOPERATIVE ANEMIA DUE TO ACUTE BLOOD LOSS: Status: ACTIVE | Noted: 2018-10-11

## 2018-10-11 PROBLEM — E87.6 HYPOKALEMIA: Status: RESOLVED | Noted: 2018-08-17 | Resolved: 2018-10-11

## 2018-10-11 PROCEDURE — 86850 RBC ANTIBODY SCREEN: CPT

## 2018-10-11 PROCEDURE — 80053 COMPREHEN METABOLIC PANEL: CPT | Performed by: PHYSICIAN ASSISTANT

## 2018-10-11 PROCEDURE — 0SP90EZ REMOVAL OF ARTICULATING SPACER FROM RIGHT HIP JOINT, OPEN APPROACH: ICD-10-PCS | Performed by: ORTHOPAEDIC SURGERY

## 2018-10-11 PROCEDURE — 89051 BODY FLUID CELL COUNT: CPT | Performed by: ORTHOPAEDIC SURGERY

## 2018-10-11 PROCEDURE — 73501 X-RAY EXAM HIP UNI 1 VIEW: CPT | Performed by: ORTHOPAEDIC SURGERY

## 2018-10-11 PROCEDURE — 87206 SMEAR FLUORESCENT/ACID STAI: CPT | Performed by: ORTHOPAEDIC SURGERY

## 2018-10-11 PROCEDURE — 73501 X-RAY EXAM HIP UNI 1 VIEW: CPT | Performed by: PHYSICIAN ASSISTANT

## 2018-10-11 PROCEDURE — 87205 SMEAR GRAM STAIN: CPT | Performed by: ORTHOPAEDIC SURGERY

## 2018-10-11 PROCEDURE — 88305 TISSUE EXAM BY PATHOLOGIST: CPT | Performed by: ORTHOPAEDIC SURGERY

## 2018-10-11 PROCEDURE — 82962 GLUCOSE BLOOD TEST: CPT

## 2018-10-11 PROCEDURE — 89060 EXAM SYNOVIAL FLUID CRYSTALS: CPT | Performed by: ORTHOPAEDIC SURGERY

## 2018-10-11 PROCEDURE — 87102 FUNGUS ISOLATION CULTURE: CPT | Performed by: ORTHOPAEDIC SURGERY

## 2018-10-11 PROCEDURE — 86920 COMPATIBILITY TEST SPIN: CPT

## 2018-10-11 PROCEDURE — 86901 BLOOD TYPING SEROLOGIC RH(D): CPT

## 2018-10-11 PROCEDURE — 89050 BODY FLUID CELL COUNT: CPT | Performed by: ORTHOPAEDIC SURGERY

## 2018-10-11 PROCEDURE — 87070 CULTURE OTHR SPECIMN AEROBIC: CPT | Performed by: ORTHOPAEDIC SURGERY

## 2018-10-11 PROCEDURE — 86900 BLOOD TYPING SEROLOGIC ABO: CPT

## 2018-10-11 PROCEDURE — 88331 PATH CONSLTJ SURG 1 BLK 1SPC: CPT | Performed by: ORTHOPAEDIC SURGERY

## 2018-10-11 RX ORDER — MEPERIDINE HYDROCHLORIDE 25 MG/ML
12.5 INJECTION INTRAMUSCULAR; INTRAVENOUS; SUBCUTANEOUS AS NEEDED
Status: COMPLETED | OUTPATIENT
Start: 2018-10-11 | End: 2018-10-11

## 2018-10-11 RX ORDER — MORPHINE SULFATE 4 MG/ML
INJECTION, SOLUTION INTRAMUSCULAR; INTRAVENOUS
Status: COMPLETED
Start: 2018-10-11 | End: 2018-10-11

## 2018-10-11 RX ORDER — MEPERIDINE HYDROCHLORIDE 25 MG/ML
INJECTION INTRAMUSCULAR; INTRAVENOUS; SUBCUTANEOUS
Status: COMPLETED
Start: 2018-10-11 | End: 2018-10-11

## 2018-10-11 RX ORDER — BISACODYL 10 MG
10 SUPPOSITORY, RECTAL RECTAL
Status: DISCONTINUED | OUTPATIENT
Start: 2018-10-11 | End: 2018-10-12

## 2018-10-11 RX ORDER — OXYCODONE HYDROCHLORIDE 5 MG/1
5 TABLET ORAL EVERY 4 HOURS PRN
Status: DISCONTINUED | OUTPATIENT
Start: 2018-10-11 | End: 2018-10-12

## 2018-10-11 RX ORDER — ONDANSETRON 2 MG/ML
4 INJECTION INTRAMUSCULAR; INTRAVENOUS EVERY 4 HOURS PRN
Status: DISCONTINUED | OUTPATIENT
Start: 2018-10-11 | End: 2018-10-12

## 2018-10-11 RX ORDER — DOCUSATE SODIUM 100 MG/1
100 CAPSULE, LIQUID FILLED ORAL 2 TIMES DAILY
Qty: 60 CAPSULE | Refills: 2 | Status: SHIPPED | OUTPATIENT
Start: 2018-10-11

## 2018-10-11 RX ORDER — HYDROCODONE BITARTRATE AND ACETAMINOPHEN 5; 325 MG/1; MG/1
1 TABLET ORAL EVERY 6 HOURS PRN
Status: ON HOLD | COMMUNITY
End: 2018-10-11

## 2018-10-11 RX ORDER — POLYETHYLENE GLYCOL 3350 17 G/17G
17 POWDER, FOR SOLUTION ORAL DAILY PRN
Status: DISCONTINUED | OUTPATIENT
Start: 2018-10-11 | End: 2018-10-12

## 2018-10-11 RX ORDER — ACETAMINOPHEN 500 MG
1000 TABLET ORAL ONCE
Status: DISCONTINUED | OUTPATIENT
Start: 2018-10-11 | End: 2018-10-11 | Stop reason: HOSPADM

## 2018-10-11 RX ORDER — AMLODIPINE BESYLATE 5 MG/1
5 TABLET ORAL NIGHTLY
Status: DISCONTINUED | OUTPATIENT
Start: 2018-10-11 | End: 2018-10-12

## 2018-10-11 RX ORDER — NALOXONE HYDROCHLORIDE 0.4 MG/ML
80 INJECTION, SOLUTION INTRAMUSCULAR; INTRAVENOUS; SUBCUTANEOUS AS NEEDED
Status: DISCONTINUED | OUTPATIENT
Start: 2018-10-11 | End: 2018-10-11 | Stop reason: HOSPADM

## 2018-10-11 RX ORDER — SODIUM PHOSPHATE, DIBASIC AND SODIUM PHOSPHATE, MONOBASIC 7; 19 G/133ML; G/133ML
1 ENEMA RECTAL ONCE AS NEEDED
Status: DISCONTINUED | OUTPATIENT
Start: 2018-10-11 | End: 2018-10-12

## 2018-10-11 RX ORDER — AMITRIPTYLINE HYDROCHLORIDE 25 MG/1
25 TABLET, FILM COATED ORAL 2 TIMES DAILY
Status: DISCONTINUED | OUTPATIENT
Start: 2018-10-11 | End: 2018-10-12

## 2018-10-11 RX ORDER — MORPHINE SULFATE 4 MG/ML
6 INJECTION, SOLUTION INTRAMUSCULAR; INTRAVENOUS EVERY 2 HOUR PRN
Status: DISCONTINUED | OUTPATIENT
Start: 2018-10-11 | End: 2018-10-12

## 2018-10-11 RX ORDER — METOCLOPRAMIDE HYDROCHLORIDE 5 MG/ML
10 INJECTION INTRAMUSCULAR; INTRAVENOUS EVERY 6 HOURS PRN
Status: DISCONTINUED | OUTPATIENT
Start: 2018-10-11 | End: 2018-10-12

## 2018-10-11 RX ORDER — MORPHINE SULFATE 4 MG/ML
2 INJECTION, SOLUTION INTRAMUSCULAR; INTRAVENOUS EVERY 2 HOUR PRN
Status: DISCONTINUED | OUTPATIENT
Start: 2018-10-11 | End: 2018-10-12

## 2018-10-11 RX ORDER — SODIUM CHLORIDE, SODIUM LACTATE, POTASSIUM CHLORIDE, CALCIUM CHLORIDE 600; 310; 30; 20 MG/100ML; MG/100ML; MG/100ML; MG/100ML
INJECTION, SOLUTION INTRAVENOUS CONTINUOUS
Status: DISCONTINUED | OUTPATIENT
Start: 2018-10-11 | End: 2018-10-12

## 2018-10-11 RX ORDER — DIAZEPAM 5 MG/1
5 TABLET ORAL EVERY 6 HOURS PRN
Status: DISCONTINUED | OUTPATIENT
Start: 2018-10-11 | End: 2018-10-12

## 2018-10-11 RX ORDER — GABAPENTIN 300 MG/1
600 CAPSULE ORAL
Status: DISCONTINUED | OUTPATIENT
Start: 2018-10-12 | End: 2018-10-12

## 2018-10-11 RX ORDER — OXYCODONE HYDROCHLORIDE 10 MG/1
10 TABLET ORAL EVERY 4 HOURS PRN
Status: DISCONTINUED | OUTPATIENT
Start: 2018-10-11 | End: 2018-10-12

## 2018-10-11 RX ORDER — DOCUSATE SODIUM 100 MG/1
100 CAPSULE, LIQUID FILLED ORAL 2 TIMES DAILY
Status: DISCONTINUED | OUTPATIENT
Start: 2018-10-11 | End: 2018-10-12

## 2018-10-11 RX ORDER — SCOLOPAMINE TRANSDERMAL SYSTEM 1 MG/1
1 PATCH, EXTENDED RELEASE TRANSDERMAL ONCE
Status: DISCONTINUED | OUTPATIENT
Start: 2018-10-11 | End: 2018-10-12

## 2018-10-11 RX ORDER — ONDANSETRON 2 MG/ML
4 INJECTION INTRAMUSCULAR; INTRAVENOUS AS NEEDED
Status: DISCONTINUED | OUTPATIENT
Start: 2018-10-11 | End: 2018-10-11 | Stop reason: HOSPADM

## 2018-10-11 RX ORDER — MORPHINE SULFATE 4 MG/ML
2 INJECTION, SOLUTION INTRAMUSCULAR; INTRAVENOUS EVERY 5 MIN PRN
Status: DISCONTINUED | OUTPATIENT
Start: 2018-10-11 | End: 2018-10-11 | Stop reason: HOSPADM

## 2018-10-11 RX ORDER — GABAPENTIN 300 MG/1
300 CAPSULE ORAL EVERY MORNING
Status: DISCONTINUED | OUTPATIENT
Start: 2018-10-12 | End: 2018-10-12

## 2018-10-11 RX ORDER — LISINOPRIL 40 MG/1
40 TABLET ORAL DAILY
Status: DISCONTINUED | OUTPATIENT
Start: 2018-10-12 | End: 2018-10-12

## 2018-10-11 RX ORDER — DIPHENHYDRAMINE HCL 25 MG
25 CAPSULE ORAL EVERY 4 HOURS PRN
Status: DISCONTINUED | OUTPATIENT
Start: 2018-10-11 | End: 2018-10-12

## 2018-10-11 RX ORDER — MORPHINE SULFATE 4 MG/ML
2 INJECTION, SOLUTION INTRAMUSCULAR; INTRAVENOUS EVERY 2 HOUR PRN
Status: DISCONTINUED | OUTPATIENT
Start: 2018-10-11 | End: 2018-10-11

## 2018-10-11 RX ORDER — GABAPENTIN 100 MG/1
100 CAPSULE ORAL 3 TIMES DAILY
Status: DISCONTINUED | OUTPATIENT
Start: 2018-10-11 | End: 2018-10-11

## 2018-10-11 RX ORDER — ONDANSETRON 4 MG/1
4 TABLET, FILM COATED ORAL EVERY 8 HOURS PRN
Qty: 30 TABLET | Refills: 0 | Status: SHIPPED | OUTPATIENT
Start: 2018-10-11

## 2018-10-11 RX ORDER — DEXTROSE MONOHYDRATE 25 G/50ML
50 INJECTION, SOLUTION INTRAVENOUS
Status: DISCONTINUED | OUTPATIENT
Start: 2018-10-11 | End: 2018-10-11 | Stop reason: HOSPADM

## 2018-10-11 RX ORDER — HYDROMORPHONE HYDROCHLORIDE 1 MG/ML
0.5 INJECTION, SOLUTION INTRAMUSCULAR; INTRAVENOUS; SUBCUTANEOUS EVERY 2 HOUR PRN
Status: DISCONTINUED | OUTPATIENT
Start: 2018-10-11 | End: 2018-10-11

## 2018-10-11 RX ORDER — ACETAMINOPHEN 325 MG/1
650 TABLET ORAL EVERY 6 HOURS SCHEDULED
Status: DISCONTINUED | OUTPATIENT
Start: 2018-10-11 | End: 2018-10-12

## 2018-10-11 RX ORDER — MORPHINE SULFATE 4 MG/ML
4 INJECTION, SOLUTION INTRAMUSCULAR; INTRAVENOUS EVERY 2 HOUR PRN
Status: DISCONTINUED | OUTPATIENT
Start: 2018-10-11 | End: 2018-10-11

## 2018-10-11 RX ORDER — DIPHENHYDRAMINE HYDROCHLORIDE 50 MG/ML
12.5 INJECTION INTRAMUSCULAR; INTRAVENOUS EVERY 4 HOURS PRN
Status: DISCONTINUED | OUTPATIENT
Start: 2018-10-11 | End: 2018-10-12

## 2018-10-11 RX ORDER — MIDAZOLAM HYDROCHLORIDE 1 MG/ML
1 INJECTION INTRAMUSCULAR; INTRAVENOUS EVERY 5 MIN PRN
Status: DISCONTINUED | OUTPATIENT
Start: 2018-10-11 | End: 2018-10-11 | Stop reason: HOSPADM

## 2018-10-11 RX ORDER — ONDANSETRON 2 MG/ML
INJECTION INTRAMUSCULAR; INTRAVENOUS
Status: COMPLETED
Start: 2018-10-11 | End: 2018-10-11

## 2018-10-11 RX ORDER — MELATONIN
325 2 TIMES DAILY WITH MEALS
Status: DISCONTINUED | OUTPATIENT
Start: 2018-10-11 | End: 2018-10-12

## 2018-10-11 RX ORDER — GABAPENTIN 600 MG/1
600 TABLET ORAL NIGHTLY
Status: DISCONTINUED | OUTPATIENT
Start: 2018-10-11 | End: 2018-10-12

## 2018-10-11 RX ORDER — MORPHINE SULFATE 4 MG/ML
4 INJECTION, SOLUTION INTRAMUSCULAR; INTRAVENOUS EVERY 2 HOUR PRN
Status: DISCONTINUED | OUTPATIENT
Start: 2018-10-11 | End: 2018-10-12

## 2018-10-11 RX ORDER — DIPHENHYDRAMINE HYDROCHLORIDE 50 MG/ML
25 INJECTION INTRAMUSCULAR; INTRAVENOUS ONCE AS NEEDED
Status: ACTIVE | OUTPATIENT
Start: 2018-10-11 | End: 2018-10-11

## 2018-10-11 RX ORDER — IBUPROFEN 600 MG/1
600 TABLET ORAL EVERY 8 HOURS PRN
Qty: 90 TABLET | Refills: 2 | Status: SHIPPED | OUTPATIENT
Start: 2018-10-11

## 2018-10-11 RX ORDER — METOPROLOL TARTRATE 50 MG/1
50 TABLET, FILM COATED ORAL 2 TIMES DAILY
Status: DISCONTINUED | OUTPATIENT
Start: 2018-10-11 | End: 2018-10-12

## 2018-10-11 RX ORDER — TRAMADOL HYDROCHLORIDE 50 MG/1
TABLET ORAL EVERY 6 HOURS PRN
Status: DISCONTINUED | OUTPATIENT
Start: 2018-10-11 | End: 2018-10-12

## 2018-10-11 RX ORDER — CEFAZOLIN SODIUM/WATER 2 G/20 ML
2 SYRINGE (ML) INTRAVENOUS ONCE
Status: COMPLETED | OUTPATIENT
Start: 2018-10-11 | End: 2018-10-11

## 2018-10-11 RX ORDER — SODIUM CHLORIDE, SODIUM LACTATE, POTASSIUM CHLORIDE, CALCIUM CHLORIDE 600; 310; 30; 20 MG/100ML; MG/100ML; MG/100ML; MG/100ML
INJECTION, SOLUTION INTRAVENOUS CONTINUOUS
Status: DISCONTINUED | OUTPATIENT
Start: 2018-10-11 | End: 2018-10-11 | Stop reason: HOSPADM

## 2018-10-11 RX ORDER — PANTOPRAZOLE SODIUM 40 MG/1
40 TABLET, DELAYED RELEASE ORAL
Qty: 30 TABLET | Refills: 0 | Status: SHIPPED | OUTPATIENT
Start: 2018-10-11

## 2018-10-11 RX ORDER — HYDROMORPHONE HYDROCHLORIDE 1 MG/ML
1 INJECTION, SOLUTION INTRAMUSCULAR; INTRAVENOUS; SUBCUTANEOUS EVERY 2 HOUR PRN
Status: DISCONTINUED | OUTPATIENT
Start: 2018-10-11 | End: 2018-10-11

## 2018-10-11 RX ORDER — OXYCODONE HYDROCHLORIDE 5 MG/1
TABLET ORAL EVERY 4 HOURS PRN
Qty: 60 TABLET | Refills: 0 | Status: SHIPPED | OUTPATIENT
Start: 2018-10-11

## 2018-10-11 RX ORDER — DIPHENHYDRAMINE HYDROCHLORIDE 50 MG/ML
12.5 INJECTION INTRAMUSCULAR; INTRAVENOUS AS NEEDED
Status: DISCONTINUED | OUTPATIENT
Start: 2018-10-11 | End: 2018-10-11 | Stop reason: HOSPADM

## 2018-10-11 RX ORDER — TRAMADOL HYDROCHLORIDE 50 MG/1
TABLET ORAL EVERY 6 HOURS PRN
Qty: 60 TABLET | Refills: 0 | Status: SHIPPED | OUTPATIENT
Start: 2018-10-11

## 2018-10-11 RX ORDER — BACLOFEN 10 MG/1
10 TABLET ORAL DAILY
Status: DISCONTINUED | OUTPATIENT
Start: 2018-10-11 | End: 2018-10-12

## 2018-10-11 NOTE — PLAN OF CARE
Pt having severe pain. IV Dilaudid DC'd per pharmacy due to shortage. Gave IV MSO4 4mg instead, some improvement. Pt states \"I just can't relax my leg. .it just feels really tight. \" Notified Dr. Cliff Low with Dr. Beatris Feldman group, see new orders.  Will monitor

## 2018-10-11 NOTE — ANESTHESIA PREPROCEDURE EVALUATION
PRE-OP EVALUATION    Patient Name: Raffy Olivo    Pre-op Diagnosis: RIGHT HIP INFECTION    Procedure(s):  RIGHT HIP REVISION 2ND STAGE WITH REMOVAL OF ANTIBIOTIC SPACER    Surgeon(s) and Role:      Allen Winslow MD - Primary    Pre-op vitals reviewe Rfl:    Metoprolol Tartrate 50 MG Oral Tab Take 50 mg by mouth 2 (two) times daily. Disp:  Rfl:    gabapentin 300 MG Oral Cap Take 600 mg by mouth nightly. At night at 2000.  Disp:  Rfl:    TraMADol HCl 50 MG Oral Tab Take 1-2 tablets ( mg total) by m OTHER  2013    gastric bypass   • OTHER Right     hammer toe surgery- with hardware   • OTHER Right     hardware removal from foot-drain placed   • OTHER Right 2004    radius and ulnar repair-hardware   • OTHER  1990    Triple U surgery for ROSA- with tonsi

## 2018-10-11 NOTE — PHYSICAL THERAPY NOTE
Pt just arrived and is c/o significant pain at this time. TTWB on the R LE. Will follow up tomorrow.  Nursing is aware

## 2018-10-11 NOTE — ANESTHESIA POSTPROCEDURE EVALUATION
[address] [place] [address] Patient Status:  Surgery Admit   Age/Gender 70year old male MRN [id number]   [facility] SURGERY Attending Shilpa Khan MD   Hosp Day # 0 PCP PHYSICIAN NONSTAFF       Anesthesia Post-op Note    Procedure(s

## 2018-10-12 VITALS
SYSTOLIC BLOOD PRESSURE: 132 MMHG | BODY MASS INDEX: 33.86 KG/M2 | DIASTOLIC BLOOD PRESSURE: 77 MMHG | HEART RATE: 93 BPM | TEMPERATURE: 98 F | HEIGHT: 72 IN | WEIGHT: 250 LBS | RESPIRATION RATE: 18 BRPM | OXYGEN SATURATION: 91 %

## 2018-10-12 PROCEDURE — 97166 OT EVAL MOD COMPLEX 45 MIN: CPT

## 2018-10-12 PROCEDURE — 97116 GAIT TRAINING THERAPY: CPT

## 2018-10-12 PROCEDURE — 85027 COMPLETE CBC AUTOMATED: CPT | Performed by: PHYSICIAN ASSISTANT

## 2018-10-12 PROCEDURE — 97535 SELF CARE MNGMENT TRAINING: CPT

## 2018-10-12 PROCEDURE — 97162 PT EVAL MOD COMPLEX 30 MIN: CPT

## 2018-10-12 PROCEDURE — 80048 BASIC METABOLIC PNL TOTAL CA: CPT | Performed by: FAMILY MEDICINE

## 2018-10-12 PROCEDURE — 97530 THERAPEUTIC ACTIVITIES: CPT

## 2018-10-12 PROCEDURE — 85025 COMPLETE CBC W/AUTO DIFF WBC: CPT | Performed by: FAMILY MEDICINE

## 2018-10-12 RX ORDER — LEVOFLOXACIN 750 MG/1
750 TABLET ORAL DAILY
Status: DISCONTINUED | OUTPATIENT
Start: 2018-10-12 | End: 2018-10-12

## 2018-10-12 RX ORDER — LEVOFLOXACIN 750 MG/1
750 TABLET ORAL DAILY
Qty: 90 TABLET | Refills: 0 | Status: SHIPPED | OUTPATIENT
Start: 2018-10-12 | End: 2019-01-10

## 2018-10-12 RX ORDER — GABAPENTIN 600 MG/1
600 TABLET ORAL 2 TIMES DAILY
Qty: 60 TABLET | Refills: 0 | Status: SHIPPED | OUTPATIENT
Start: 2018-10-12

## 2018-10-12 NOTE — PHYSICAL THERAPY NOTE
PHYSICAL THERAPY HIP EVALUATION - INPATIENT     Room Number: 357/357-A  Evaluation Date: 10/12/2018  Type of Evaluation: Initial  Physician Order: PT Eval and Treat    Presenting Problem: R DONA revision  Reason for Therapy: Mobility Dysfunction and Dischar the same time   • HIP REPLACEMENT SURGERY Right 01/24/2018   • HIP REPLACEMENT SURGERY Right 06/13/2018    2nd time   • HIP SURGERY Right 08/16/2018    right hip resection with antibiotic spacer insertion   • HIP TOTAL REPLACEMENT Right 8/16/2018    Perfor 3-/5  Left Knee extension  3+/5  Right Knee flexion  4-/5    BALANCE  Static Sitting: Fair  Dynamic Sitting: Fair -  Static Standing: Fair -  Dynamic Standing: Poor +    ADDITIONAL TESTS                                 ACTIVITY TOLERANCE  O2 Saturation: 87 positioning.,activity. Pt does require redirection as he demonstrates decreased attention, some confusion. Pt does appear groggy, cz73-90-3% on RA, [id number] reapplied 2L 02 with return to >92%.       Exercise/Education Provided:  Bed mobility  Functional assistance level: modified independent    Goal #4    Patient will negotiate 4 stairs/one curb w/ assistive device and supervision   Goal #5    Patient verbalizes and/or demonstrates all precautions and safety concerns independently   Goal #6        Goal Co

## 2018-10-12 NOTE — PROGRESS NOTES
[facility]    Progress Note    Florencia Seat Patient Status:  Inpatient    1955 MRN [id number]   [facility] 3SW-A Attending Mirna Sterling MD   [facility] Day # 1 PCP PHYSICIAN NONSTAFF       Subjective:   Florencia Seat is a(n) 70 (CPT=73501), 10/11/2018, 15:40. INDICATIONS:  Right arthroplasty revision. PATIENT STATED HISTORY: (As transcribed by Technologist)  Right hip arthroplasty revision. FINDINGS:  BONES:  Right-sided arthroplasty with anatomic alignment.   No fracture dis

## 2018-10-12 NOTE — PHYSICAL THERAPY NOTE
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 Pt willing to participate in session, working toward Plan: Bed mobility; Endurance; Body mechanics; Energy conservation;Patient education; Family education;Gait training;Transfer training  Rehab Potential : Good  Frequency (Obs): Daily    CURRENT GOALS    Goal #1  Patient is able to demonstrate supine - sit EOB

## 2018-10-12 NOTE — PLAN OF CARE
Pt arrived from PACU on bed. Spouse at bedside. VSS. Having severe pain and nausea, will give medications once verified. Oriented to room and call light system. Educated on fall precautions. Dr. Ewing Romberg to see pt. Will monitor.

## 2018-10-12 NOTE — PLAN OF CARE
PAIN - ADULT    • Verbalizes/displays adequate comfort level or patient's stated pain goal Progressing        Awake, a/o x4. States done better with physical therapy this afternoon. States wants to go home today.   Dressing to right hip clean, dry, intact

## 2018-10-12 NOTE — OPERATIVE REPORT
[facility]    PATIENT'S NAME: Sharlenehiram Moreau   ATTENDING PHYSICIAN: Saurabh Last MD   OPERATING PHYSICIAN: Saurabh Renteria.  MD Shala   PATIENT ACCOUNT#:   [id number]    LOCATION:  3SW-A [facility] A [facility]  MEDICAL RECORD #:   [id number]       DATE OF BIRTH:  0 acetabular defect was consistent with a type IIa acetabular defect, the femoral defect consistent with a type II defect. A cementless acetabular femoral component was able to be placed in stable position.     OPERATIVE TECHNIQUE:  The patient was admitted t mm.  The 65 mm reamer engaged the anterior, superior, as well as posteroinferior bone. Subsequently, a 66 mm acetabular component was placed in 45 degrees of verticality and 20 degrees of anteversion. This was fully seated and gave excellent stability. counts were correct x2. There appeared to be no intraoperative complications. It should be noted that I utilized the assistance of an assistant, who was present and necessary for the case.   She assisted with the initial preoperative preparation, intrao

## 2018-10-12 NOTE — PROGRESS NOTES
[facility]    Progress Note    Florencia Seat Patient Status:  Inpatient    1955 MRN [id number]   [facility] 3SW-A Attending Mirna Sterling MD   Hosp Day # 0 PCP PHYSICIAN NONSTAFF       Subjective:   Florencia Seat is a(n) 70 transcribed by Technologist)  Right hip arthroplasty revision. FINDINGS:  BONES:  Right-sided arthroplasty with anatomic alignment. No fracture dislocation. SOFT TISSUES:  Heterotopic bone medial to the lesser trochanter. Sivanetta Netter   No visible soft tissue swell

## 2018-10-12 NOTE — CM/SW NOTE
10/12/18 1100   CM/SW Referral Data   Referral Source Physician   Reason for Referral Discharge planning   Informant Patient   Social History   Recreational Drug/Alcohol Use n   Major Changes Last 6 Months n   Domestic/Partner Violence n   Suicidal Idea

## 2018-10-12 NOTE — PHYSICAL THERAPY NOTE
Attempted PT evaluation, pt very drowsy. Despite maximal reinforcement, pt unable to stay awake, slow processing, unable to follow commands consistently. , episodes of desat while supine in bed,[id number]% on 2L 02. Pt is not appropriate for PT asses

## 2018-10-12 NOTE — OCCUPATIONAL THERAPY NOTE
OCCUPATIONAL THERAPY EVALUATION - INPATIENT     Room Number: 357/357-A  Evaluation Date: 10/12/2018  Type of Evaluation: Initial  Presenting Problem: s/p right hip revision 2nd stage with removal of antibiotic spacer    Physician Order: IP Consult to [facility] losing over 150 pounds   • High blood pressure    • Neuropathy     bilateral feet and bilateral hands thumbs and 1st 2 fingers   • Osteoarthritis    • Prosthetic hip infection (HCC)     IV antibiotics x 8 weeks 3/2018 and 7//2018 with wound vac application home, and use of motorized w/c at home.     SUBJECTIVE   \"I know, I've done this all before\"    Patient self-stated goal is to go home     OBJECTIVE  Precautions: DONA - posterior(no active hip adbuction)  Fall Risk: High fall risk    WEIGHT BEARING RESTRI (AM-PAC Scale): 40.22  CMS Modifier (G-Code): CK    FUNCTIONAL TRANSFER ASSESSMENT  Supine to Sit : Moderate assistance  Sit to Stand: Minimum assistance    Skilled Therapy Provided: Pt was in a semisupine position on arrival and willing to participate in decreased standing balance, decreased activity tolerance, decreased dynamic sitting balance, decreased insight into functional deficits, impulsivity and difficulties with pain control.  These deficits impact the patient’s ability to participate in ADLs, ins during ADLs  Pt will verbalize understanding of posterior hip precautions.

## 2018-10-12 NOTE — CONSULTS
INFECTIOUS DISEASE CONSULT NOTE    [facility] Patient Status:  Inpatient    1955 MRN [id number]   [facility] 3SW-A Attending Gianluca Rodriguez MD   [facility] Day # 1 PCP PHYSICIAN N repair at the same time   • HIP REPLACEMENT SURGERY Right 01/24/2018   • HIP REPLACEMENT SURGERY Right 06/13/2018    2nd time   • HIP SURGERY Right 08/16/2018    right hip resection with antibiotic spacer insertion   • HIP TOTAL REPLACEMENT Right 8/16/2018 Metoclopramide HCl (REGLAN) injection 10 mg, 10 mg, Intravenous, Q6H PRN  •  diphenhydrAMINE (BENADRYL) cap/tab 25 mg, 25 mg, Oral, Q4H PRN **OR** DiphenhydrAMINE HCl (BENADRYL) injection 12.5 mg, 12.5 mg, Intravenous, Q4H PRN  •  apixaban (ELIQUIS) tab 2. lymphadenopathy. Supple. Respiratory: Clear to auscultation bilaterally. No wheezes. No rhonchi. Cardiovascular: S1, S2.  Regular rate and rhythm. No murmurs. Abdomen: Soft, nontender, nondistended. Positive bowel sounds.   Musculoskeletal: L hip wit WO PELVIS 1 VIEW, RIGHT (CPT=73501), 10/11/2018, 16:19. EDWARD , XR HIP W OR WO PELVIS 1 VIEW, RIGHT (CPT=73501), 8/16/2018, 19:44. INDICATIONS:  Total right hip arthroplasty.   PATIENT STATED HISTORY: (As transcribed by Technologist)  Patient offered no

## 2018-10-13 NOTE — PROGRESS NOTES
[facility]    Progress Note    Dodie Hu Patient Status:  Inpatient    1955 MRN [id number]   [facility] 3SW-A Attending No att. providers found   Hosp Day # 1 PCP PHYSICIAN NONSTAFF       Subjective:   Dodie Hu is a OR WO PELVIS 1 VIEW, RIGHT (CPT=73501), 10/11/2018, 15:40. INDICATIONS:  Right arthroplasty revision. PATIENT STATED HISTORY: (As transcribed by Technologist)  Right hip arthroplasty revision.     FINDINGS:  BONES:  Right-sided arthroplasty with anatomic

## 2018-12-06 NOTE — H&P
[facility]    History and Physical    Cassyserge Toro Patient Status:  Inpatient    1955 MRN [id number]   [facility] 3SW-A Attending No att. providers found   Hosp Day # 1 PCP PHYSICIAN NONSTAFF     Date of Admission:  10/11/2018 repair-hardware   • OTHER  1990    Triple U surgery for ROSA- with tonsillectomy   • OTHER Right 03/2018    infected hip joint taken out   • OTHER Right 05/30/2018    aspiration of hip joint       Family History  History reviewed.  No pertinent family histor rhythm. No murmurs. Equal pulses   Abdomen: Soft, nontender, nondistended. Positive bowel sounds. No rebound tenderness  Neurologic: No focal neurological deficits.   Musculoskeletal: Full range of motion of all extremities, except R hip (decreased ROM wi

## 2023-05-08 NOTE — CM/SW NOTE
08/20/18 1400   Discharge disposition   Expected discharge disposition Skilled Nurs   Name of Facillity/Home Care/Hospice ([facility] [place] [address])   Patient is Discharged to a [address] [place] [address] Yes   Discharge transportation Private car
BELA updated pt and asked him to call [facility] as well. Return call from Luna, liaison for [facility]. She states that pt is accepted for today. She will alert building to expect him this evening.  She asked if pt received just one IVAB- he does only have
Contacted [facility] again re: no return call. Referred to hospital liaison, Wisam Guthrie [phone]. Message left for her as well.
MSW sent referral to [facility] in [address] [facility] via [address]. DON screen not required per Patricia Yu unless the patient will be going to rehab in [facility]. Awaiting response from JOON Han: Duglas DANIELSON Bass 89, LCSW
Message left for [facility] CONNORS/CHRISTOPHE Back). Update sent via [facility] [address]. Informed by RN during d/c rounds that wife will transport at d/c.
SW contacted pt re: d/c planning. He states that he will be having a right hip resection with a spacer tomorrow and will need 8wks of IVAB. He adds that his understanding is that he will be non wt bearing on right LE.   Pt states that he lives with his wi
SW follow up, [phone] [facility], left message for Mimi Castelan in admissions. Will follow up.
Updated pt and wife re: [facility]. Have not heard back from [place] with [facility] as yet re: skilled/medicare bed for pt. Wife confirms that she will transport. Pt notes that he was at above [facility] recently for about 2.5 wks.   SW discussed Medicare [facility]NF b
sw contacted [facility] rehab, sw unable to reach anyone in admissions to see if they can accept pt. sw to follow up
Attending with

## (undated) DEVICE — HOOD, PEEL-AWAY: Brand: FLYTE

## (undated) DEVICE — SPECIMEN CONTAINER,POSITIVE SEAL INDICATOR, OR PACKAGED: Brand: PRECISION

## (undated) DEVICE — SYRINGE 30ML LL TIP

## (undated) DEVICE — SUTURE ETHILON 2-0 LR

## (undated) DEVICE — GLOVE SURG SENSICARE SZ 6-1/2

## (undated) DEVICE — BIT DRL 30MM 3.2MM RNGLC ACTB

## (undated) DEVICE — TOTAL HIP CDS: Brand: MEDLINE INDUSTRIES, INC.

## (undated) DEVICE — PILLOW ABDUCTION HIP MED

## (undated) DEVICE — 5.0MM X 7.0MM FLUTED DRUM

## (undated) DEVICE — 1010 S-DRAPE TOWEL DRAPE 10/BX: Brand: STERI-DRAPE™

## (undated) DEVICE — SYRINGE 50ML LL TIP

## (undated) DEVICE — STERILE POLYISOPRENE POWDER-FREE SURGICAL GLOVES: Brand: PROTEXIS

## (undated) DEVICE — STANDARD HYPODERMIC NEEDLE,POLYPROPYLENE HUB: Brand: MONOJECT

## (undated) DEVICE — SUTURE MONOCRYL 3-0 PS-2

## (undated) DEVICE — CHLORAPREP 26ML APPLICATOR

## (undated) DEVICE — GAUZE SPONGES,12 PLY: Brand: CURITY

## (undated) DEVICE — LAPAROTOMY SPONGE - RF AND X-RAY DETECTABLE PRE-WASHED: Brand: SITUATE

## (undated) DEVICE — Device: Brand: STABLECUT®

## (undated) DEVICE — ALCOHOL 70% 4 OZ

## (undated) DEVICE — BLADE ELECTRODE: Brand: EDGE

## (undated) DEVICE — SOL  .9 1000ML BTL

## (undated) DEVICE — NEEDLE SPINAL 18X3-1/2 PINK.

## (undated) DEVICE — DRAPE CASSETTE X-RAY

## (undated) DEVICE — 5.5MM ROUND FAST CUTTING BUR

## (undated) DEVICE — ANTIBACTERIAL VIOLET BRAIDED (POLYGLACTIN 910), SYNTHETIC ABSORBABLE SUTURE: Brand: COATED VICRYL

## (undated) DEVICE — SOL  .9 3000ML

## (undated) DEVICE — ANTIBACTERIAL UNDYED BRAIDED (POLYGLACTIN 910), SYNTHETIC ABSORBABLE SUTURE: Brand: COATED VICRYL

## (undated) DEVICE — DRESSING AQUACEL AG 3.5X12

## (undated) DEVICE — 2.1MM X 19.6MM METAL CUTTING HELIOCOIDAL RASP

## (undated) DEVICE — PROXIMATE SKIN STAPLERS (35 WIDE) CONTAINS 35 STAINLESS STEEL STAPLES (FIXED HEAD): Brand: PROXIMATE

## (undated) DEVICE — SYRINGE 10ML LL TIP

## (undated) NOTE — LETTER
[facility] Testing Department  Phone: [phone]  OUTSIDE TESTING RESULT REQUEST      TO:   Dr. Sanchez Teresa Today's Date: 8/6/18    FAX #: [phone]     IMPORTANT: FOR YOUR IMMEDIATE ATTENTION  Please FAX all test results listed below to: [phone]

## (undated) NOTE — LETTER
Last Revised 02/07/06  Obstructive Sleep Apnea Questionnaire    Clinical signs and symptoms suggesting the possibility of ROSA    1. Predisposing physical characteristics (positive with any of the following present)  ? BMI 35kg/m²  ?  Craniofacial abnormalit pauses which are frightening to the observer, patient regularly falls asleep within minutes after being left unstimulated) in which case they should be treated as though they have severe sleep apnea.     The sleep laboratory’s assessment (none, mild, modera Point Total for B           C. Requirement for postoperative opioids.                Opioid requirement             Points   None 0    Low dose oral opiod 1    High dose oral opioids, parenteral or neuraxial opiods 3      Point Total for C        Estimation

## (undated) NOTE — LETTER
Quynh Hobbs [address] [facility] [address], [address] [facility]     PICC LINE INSERTION CONSENT     I agree to have a Peripherally Inserted Central Catheter (PICC) placed in my arm.    1. The PICC insertion procedure, care, maintenance, risks, benefits, and c goals; and potential problems that might occur during recuperation.  They also discussed reasonable alternatives to the PICC, including risks, benefits, and side effects related to the alternatives and risks related to not receiving this procedure      ____

## (undated) NOTE — LETTER
[facility] Testing Department  Phone: [phone]  OUTSIDE TESTING RESULT REQUEST      TO:   Dr. Yolanda Krishna Date: 10/3/18    FAX #: [phone]     IMPORTANT: FOR YOUR IMMEDIATE ATTENTION  Please FAX all test results listed below to: [phone]

## (undated) NOTE — IP AVS SNAPSHOT
Patient Demographics     Address  [address] [place] [address] Phone  [phone] [facility])  [phone] (Mobile) *Preferred* E-mail Address  [email or web address]      Emergency Contact(s)     Name Relation Home Work Mobile    Pretty Doshi 3. How should I take care of my incision and dressing? .   ? Take down the gauze dressing the day after surgery. Use dry, gauze dressing secured with an ACE bandage for 3 days following your surgery.  You should receive these dressings from the hospital pr ? It is common for the knee and hip to be red and warm after surgery  ? Our suspicion for infection rises with any of the following:  o 1. There is pus like discharge from the incision  o 2. Your temperature is over 101 degrees  o 3.   It is painful to mo your surgery which will be good for six months. Please ask our office at your pre-op or post-op appointment if you feel you will need a placard.   If you feel you need a handicap placard for longer than 6 months from surgery please contact your primary car Alla Payan MD In 2 weeks. Specialty:  SURGERY, ORTHOPEDIC  Contact information:  [address] [phone]             Nonstaff, Physician In 1 week.     Contact information:  [address] [facility] levofloxacin 750 MG Tabs  Commonly known as:  LEVAQUIN  Next dose due:  10/13/2018 2 pm      Take 1 tablet (750 mg total) by mouth daily.   Stop taking on:  1/10/2019   Leon Contreras MD         lisinopril 40 MG Tabs  Commonly known as:  PRINIVIL,ZEST [id number] Amitriptyline HCl (ELAVIL) tab 25 mg 10/12/18 1002 Given      [id number] Metoclopramide HCl (REGLAN) injection 10 mg 10/11/18 1749 Given      [id number] Metoprolol Tartrate (LOPRESSOR) tab 50 mg 10/11/18 2032 Given      [id number] Metoprolol Yamila Whittington #1) 10/11/18 1948 Given      [id number] morphINE sulfate (PF) 4 MG/ML injection 6 mg 10/11/18 2150 Given      [id number] morphINE sulfate (PF) 4 MG/ML injection 6 mg 10/12/18 0000 Given      [id number] morphINE sulfate (PF) 4 MG/ML injection 6 mg 10/12/18 060 Calculated Osmolality 286 275 - 295 mOsm/kg — [facility]   GFR, Non-African American 90 >=60 — [facility]   GFR, -American 104 >=60 — [facility]   Comment:           Estimated GFR units: mL/min/1.73 square meters   eGFR calculated by the CKD-EPI equ Type Source Collected On   Blood — 10/11/18 7163          Components    Component Value Reference Range [facility]   Glucose 108 70 - 99 mg/dL [facility]   Sodium 140 136 - 144 mmol/L — [facility]   Potassium 4.2 3.6 - 5.1 mmol/L — [facility]   Chloride 10 Specimen: Body fluid, unspecified from Synovial fluid,hip     Narrative: The following orders were created for panel order FUNGUS CULTURE AND SMEAR.   Procedure                               Abnormality         Status                     --------- Consults signed by Juice Davison MD at 10/12/2018 11:17 AM     Author:  Juice Davison MD Service:  Infectious Disease Author Type:  Physician    Filed:  10/12/2018 11:17 AM Date of Service:  10/12/2018 10:49 AM Status:  Signed    :  Juice Davison IV antibiotics x 8 weeks 3/2018 and 7//2018 with wound vac application on the 2nd hip infection   • Sleep apnea     prior to gastric bypass and losing over 150 pounds-pt says fine now   • Syncope     for over 9 years-episode lasts for 5 minutes to 5 hours •  docusate sodium (COLACE) cap 100 mg, 100 mg, Oral, BID  •  PEG 3350 (MIRALAX) powder packet 17 g, 17 g, Oral, Daily PRN  •  magnesium hydroxide (MILK OF MAGNESIA) 400 MG/5ML suspension 30 mL, 30 mL, Oral, Daily PRN  •  bisacodyl (DULCOLAX) rectal suppos •  gabapentin (NEURONTIN) cap 600 mg, 600 mg, Oral, After lunch    Review of Systems:    Completed. See pertinent positives and negatives in the the HPI. Physical Exam:    General: No acute distress. Alert and oriented x 3.   Vital signs: Blood pressure dislocation. SOFT TISSUES:  Heterotopic bone medial to the lesser trochanter. Dto Chang No visible soft tissue swelling. EFFUSION:  None visible. OTHER:  Negative. CONCLUSION  Right hip arthroplasty with anatomic alignment.    Dictated by: Cesar Hartmann MD on 10/1 IC.1 Faby Jiang MD on 10/12/2018 10:49 AM                     D/C Summary     No notes of this type exist for this encounter.          Physical Therapy Notes (last 72 hours) (Notes from 10/9/2018  5:14 PM through 10/12/2018  5:14 PM)      Physical The Procedure Laterality Date   • CHOLECYSTECTOMY  2013    with EGD and hernia repair at the same time   • HIP REPLACEMENT SURGERY Right 01/24/2018   • HIP REPLACEMENT SURGERY Right 06/13/2018    2nd time   • HIP SURGERY Right 08/16/2018    right hip resection wheelchair, bedside commode, etc.): A Little   -   Moving from lying on back to sitting on the side of the bed?: A Little[AR.2]   How much help from another person does the patient currently need. .. [AR.1]   -   Moving to and from a bed to a chair (includin ASSESSMENT     Pt seen for gait training, active flexibility and BLE strengthening, due to [facility] admission for R DONA Revision.     Results of the AM-PAC \"6 clicks\" Inpatient Daily Mobility Short Form for the patient is 46.58% degree of basic mob PM  Version 2 of 2    Author:  Shahrzad Williamson PT Service:  Rehab Author Type:  Physical Therapist    Filed:  10/12/2018 12:46 PM Date of Service:  10/12/2018 12:24 PM Status:  Addendum    :  Shahrzad Williamson PT (Physical Therapist)    Related N techniques;Relaxation;Repositioning    COGNITION[EP.1]  · Overall Cognitive Status:  Impaired and insight, safety awareness, working memory[EP.3]    RANGE OF MOTION AND STRENGTH ASSESSMENT  Upper extremity ROM and strength are within functional limits Skilled Therapy Provided:[EP.1] Pt educated on hip precautions. Pt performed supine to sit with min A, HOB el evated to 60 degrees (pt plans to sleep up in recliner chair at home).  Pt performed sit to stand with CGA, cues for RLE placement and positioning the above deficits to assist patient in returning to prior to level of function. DISCHARGE RECOMMENDATIONS  PT Discharge Recommendations: Home with home health PT    PLAN  PT Treatment Plan: Bed mobility; Endurance; Body mechanics; Energy conservation;Patien Reason for Therapy: Mobility Dysfunction and Discharge Planning    History related to current admission:[EP.1]     Pt is 70year old male admitted on 10/11/2018 for elective R DONA revision, antibiotic spacer removal.          Therapy significant co-morbidi right hip resection with antibiotic spacer insertion   • HIP TOTAL REPLACEMENT Right 8/16/2018    Performed by Rubie Klinefelter, MD at [address] [facility]   • KNEE REPLACEMENT SURGERY Right 2006?    • OTHER      wisdom teeth   • OTHER  2013    gastric bypass   • OTHER Static Sitting: Fair  Dynamic Sitting: Fair -  Static Standing: Fair -  Dynamic Standing: Poor +    ADDITIONAL TESTS                                 ACTIVITY TOLERANCE[EP.1]  O2 Saturation: 87-92%  Room air  Liters of O2:  2  No shortness of breath  Heart does require redirection as he demonstrates decreased attention, some confusion. [EP.3]      Exercise/Education Provided:[EP.1]  Bed mobility  Functional activity tolerated  Gait training  Transfer training[EP.3]    Patient End of Session: Up in chair;Needs at assistance level: modified independent    Goal #4    Patient will negotiate 4 stairs/one curb w/ assistive device and supervision   Goal #5    Patient verbalizes and/or demonstrates all precautions and safety concerns independently   Goal #6        Goal Occupational Therapy Note signed by DORCAS Garcia OTR/L at 10/12/2018  2:35 PM  Version 2 of 2    Author:  DORCAS Garcia OTR/L Service:  — Author Type:  Occupational Therapist    Filed:  10/12/2018  2:35 PM Date of Service:  10/12/201 Active Problems:    Deep vein thrombosis (HCC)    HTN (hypertension), benign    Neuropathy    History of gastric bypass    Obesity with serious comorbidity    PAF (paroxysmal atrial fibrillation) (HCC)    Postoperative anemia due to acute blood loss    His Type of Home: House  Home Layout: One level  Lives With: Spouse    Toilet and Equipment: Standard height toilet; Other (Comment)(toilet safety frame)  Shower/Tub and Equipment: Walk-in shower; Shower chair  Other Equipment: None    Occupation/Status: retired ACTIVITY TOLERANCE[ALFREDO. 1]   Room air  Shortness of breath[ALFREDO. 4]    ACTIVITIES OF DAILY LIVING ASSESSMENT  AM-PAC ‘6-Clicks’ Inpatient Daily Activity Short Form  How much help from another person does the patient currently need…  -   Putting on and taking of Patient End of Session: Up in chair;Needs met;Call light within reach;RN aware of session/findings; All patient questions and concerns addressed;SCDs in place; Family present; Discussed recommendations with /    ASSESSMENT     Patient OT Discharge Recommendations: Home with home health PT/OT  OT Device Recommendations: Reacher;Sock aid; Shower chair  PLAN  OT Treatment Plan: Balance activities; Energy conservation/work simplification techniques;ADL training;Functional transfer training;En Presenting Problem: s/p right hip revision 2nd stage with removal of antibiotic spacer    Physician Order: IP Consult to Occupational Therapy  Reason for Therapy: ADL/IADL Dysfunction and Discharge Planning    History related to current admission: Pt [address] bilateral feet and bilateral hands thumbs and 1st 2 fingers   • Osteoarthritis    • Prosthetic hip infection (HCC)     IV antibiotics x 8 weeks 3/2018 and 7//2018 with wound vac application on the 2nd hip infection   • Sleep apnea     prior to gastric byp SUBJECTIVE[ALFREDO.1]   \"I know, I've done this all before\"[ALFREDO.4]    Patient self-stated goal [address]. 1] to go home[ALFREDO.4]     OBJECTIVE  Precautions: DONA - posterior(no active hip adbuction)  Fall Risk: High fall risk    WEIGHT BEARING RESTRICTION  Weight Beari AM-PAC Score:  Score: 19  Approx Degree of Impairment: 42.8%  Standardized Score (AM-PAC Scale): 40.22  CMS Modifier (G-Code): CK    FUNCTIONAL TRANSFER ASSESSMENT  Supine to Sit : Moderate assistance  Sit to Stand: Minimum assistance    Skilled Therapy Pr These deficits impact the patient’s ability to participate [place]. 1] ADLs[ALFREDO.4], instrumental activities of daily living, rest and sleep, work, leisure and social participation.      The patient is functioning below his previous functional level and would be Attribution Hathaway    ALFREDO.1 - Cascade Maryjanemp, [place], OTR/L on 10/12/2018 12:49 PM  ALFREDO.2 - Jamir Cramp, MOT, OTR/L on 10/12/2018 12:50 PM  ALFREDO.3 - Cascade Cramp, MOT, OTR/L on 10/12/2018  1:46 PM  ALFREDO.4 - Jamir Cramp, MOT, OTR/L on 10/12/201

## (undated) NOTE — IP AVS SNAPSHOT
Patient Demographics     Address  [address] [place] [address] Phone  [phone] [facility])  [phone] (Mobile) *Preferred* E-mail Address  [email or web address]      Emergency Contact(s)     Name Relation Home Work Mobile    Pretty Doshi 3. How should I take care of my incision and dressing? ? The Aquacel Ag dressing must be removed after 7 days unless saturated before then. Please review instructions on proper removal of the dressing.    ? If dressing becomes saturated before the 7 day ma ? It is common for the knee and hip to be red and warm after surgery  ? Our suspicion for infection rises with any of the following:  o 1. There is pus like discharge from the incision  o 2. Your temperature is over 101 degrees  o 3.   It is painful to mo ? We are happy to provide you with ONE handicap placard at the time of your surgery which will be good for six months. Please ask our office at your pre-op or post-op appointment if you feel you will need a placard.   If you feel you need a handicap placar instructions: The Aquacel dressing is to be removed on post-op day 7, unless the dressing is not adhering properly—in which case the dressing is to be changed sooner. To remove the dressing, see instructions below.         Aquacel Removal  Press down on t [address] [facility]  [phone]             Via Eli José, Physician In 1 week.     Why:  follow up 1 week after discharge from rehab  Contact information:  [address]                  Your medication list Take 600 mg by mouth nightly. At night at 2000. ibuprofen 600 MG Tabs  Commonly known as:  MOTRIN      Take 1 tablet (600 mg total) by mouth every 8 (eight) hours as needed for Pain.    Krystyna Rod PA-C         lisinopril 40 MG Tabs  Commonly kn Please  your prescriptions at the location directed by your doctor or nurse    Bring a paper prescription for each of these medications  apixaban 5 MG Tabs  docusate sodium 100 MG Caps  ibuprofen 600 MG Tabs  meropenem 1 g Solr  Ondansetron HCl 4 mg [id number] acetaminophen (TYLENOL) tab 650 mg 08/20/18 0126 Given      [id number] acetaminophen (TYLENOL) tab 650 mg 08/20/18 0844 Given      [id number] acetaminophen (TYLENOL) tab 650 mg 08/20/18 1327 Given      [id number] apixaban (ELIQUIS) tab 5 mg 08/19/1 Testing Performed By     [facility] Name Director Address Valid Date Range    [place] [address] LAB Sunita Lehman  [address] [id number] 02/03/16 1801 - Present            Microbiology Results (All)     Procedure Related Notes:  Original Note by Manjeet Conley MD (Physician) filed at 8/17/2018 11:52 AM     Consult Orders:    1.  IP consult to Infectious Disease Once [[id number]] ordered by Nica Parada PA-C at 08/16/18 1246 bilateral feet and bilateral hands thumbs and 1st 2 fingers   • Osteoarthritis    • Prosthetic hip infection (HCC)     IV antibiotics x 8 weeks 3/2018 and 7//2018 with wound vac application on the 2nd hip infection   • Sleep apnea     prior to gastric byp •  sodium chloride 0.9% IV bolus 500 mL, 500 mL, Intravenous, Once PRN  •  lactated ringers infusion, , Intravenous, Continuous  •  docusate sodium (COLACE) cap 100 mg, 100 mg, Oral, BID  •  PEG 3350 (MIRALAX) powder packet 17 g, 17 g, Oral, Daily PRN  • **OR** [DISCONTINUED] Glucose-Vitamin C (DEX-4) 4-0.006 g chewable tab 4 tablet, 4 tablet, Oral, Q15 Min PRN **OR** [DISCONTINUED] dextrose 50 % injection 50 mL, 50 mL, Intravenous, Q15 Min PRN **OR** [DISCONTINUED] glucose (DEX4) oral liquid 30 g, 30 g, O Infection of right prosthetic hip joint ([facility] [address]     HTN (hypertension), benign     Neuropathy     Deep vein thrombosis (HCC)     History of diabetes mellitus     History of gastric bypass     Obesity with serious comorbidity     PAF (paroxysmal atrial fib TRISTIAN.6 - Dulce Lopez MD on 8/17/2018 11:50 AM                     D/C Summary     No notes of this type exist for this encounter.          Physical Therapy Notes (last 72 hours) (Notes from 8/17/2018  3:45 PM through 8/20/2018  3:45 PM)      Physical prior to gastric bypass and losing over 150 pounds-pt says fine now   • Syncope     for over 9 years-episode lasts for 5 minutes to 5 hours- amnesia from the 3-4 hours prior to each episode       Past Surgical History  Past Surgical History:  2013: [facility] -   Moving from lying on back to sitting on the side of the bed?: A Little   How much help from another person does the patient currently need. ..   -   Moving to and from a bed to a chair (including a wheelchair)?: A Little   -   Need to walk in hospital r d/c to SNF for continued IV antibiotics. DISCHARGE RECOMMENDATIONS  PT Discharge Recommendations: Skilled nursing facility (pt need IV  antibiotics.)    PLAN  PT Treatment Plan: Bed mobility; Body mechanics; Endurance; Energy conservation;Patient educati HTN (hypertension), benign    Neuropathy    History of gastric bypass    Obesity with serious comorbidity    PAF (paroxysmal atrial fibrillation) ([facility] [address]    Hypokalemia      Past Medical History  Past Medical History:   Diagnosis Date   • Arrhythmia 2012 R Lower Extremity: Toe Touch Weight Bearing       PAIN ASSESSMENT   Ratin  Location: R hip  Management Techniques: Activity promotion; Body mechanics;Repositioning;Relaxation    BALANCE Glut sets  LAQ  Quad sets  SAQ  Heel slides in minimal range   Upper Extremity n/a     Position Sitting     Repetitions   20   Sets   1     Patient End of Session: Up in chair;Needs met    ASSESSMENT   This pt is s/p R hip resection with antibiotic spacer Maddie Quan April, PTA Service:  (none) Author Type:  Physical Therapy Assistant    Filed:  8/18/2018  3:30 PM Date of Service:  8/18/2018  2:17 PM Status:  Signed    :  Kamari Rueda PTA (Physical Therapy Assistant)        PHYSICAL THERAPY TR Comment: hardware removal from foot-drain placed  2004: OTHER Right      Comment: radius and ulnar repair-hardware  1990: OTHER      Comment: Triple U surgery for ROSA- with tonsillectomy  03/2018: OTHER Right      Comment: infected hip joint taken out Distance (ft): 25,25 (standing rest break)  Assistive Device: Rolling walker  Pattern: R Decreased stance time (TTWB on R LE, cueing to maintain this.)  Stoop/Curb Assistance: Not tested       Skilled Therapy Provided: RN approved session.   Pt received sta training;Neuromuscular re-educate;Strengthening;Stoop training;Range of motion;Stair training;Transfer training;Balance training  Rehab Potential : Good  Frequency (Obs): BID    CURRENT GOALS[AK. 1]   Goal #1  Patient is able to demonstrate supine - sit EOB Infection of right prosthetic hip joint ([facility] [address]    Deep vein thrombosis (HCC)    HTN (hypertension), benign    Neuropathy    History of gastric bypass    Obesity with serious comorbidity    PAF (paroxysmal atrial fibrillation) (HCC)    Hypokalemia[BR.2] OBJECTIVE[BR.1]  Precautions: DONA - posterior (+ no active ABD)[BR.2]    WEIGHT BEARING STATUS[BR.1]  Weight Bearing Restriction: R lower extremity        R Lower Extremity: Toe Touch Weight Bearing[BR.2]       PAIN ASSESSMENT[BR.1]   Rating: 3  Location: Glut Sets 10 reps    Hip Abd/Add na    Heel slides 10 reps    Saq 10 reps    Laq  r10 eps                          Comments: Pt participated in group session, tolerance was good.    was present yes   is a wife[BR.1]        Patient End of Session: Goal Comments: Goals established on 8/17/2018. ongoing[BR.1]       [employer]    BR. 1 - Riddhi Moncada, PTA on 8/17/2018  3:39 PM  BR. 2 - Blanquita Moncada, PTA on 8/17/2018  3:45 PM                        Occupational Therapy Notes (last 72 hour prior to gastric bypass and losing over 150 pounds-now diet controlled   • High blood pressure    • Neuropathy     bilateral feet and bilateral hands thumbs and 1st 2 fingers   • Osteoarthritis    • Prosthetic hip infection (HCC)     IV antibiotics x 8 we How much help from another person does the patient currently need…  -   Putting on and taking off regular lower body clothing?: A Little  -   Bathing (including washing, rinsing, drying)?: A Little  -   Toileting, which includes using toilet, bedpan or uri Patient will perform Toileting with supervision    FUNCTIONAL TRANSFER GOALS   Patient will transfer Supine to Sit with supervision  Patient will transfer Sit to Supine with supervision  Patient will transfer to Toilet with supervision    ADDITIONAL GOALS

## (undated) NOTE — LETTER
[facility]  Phone: [phone]  OUTSIDE TESTING RESULT REQUEST      TO:   [facility] Medical Records Today's Date: 8/6/18    FAX #: [phone]     IMPORTANT: FOR YOUR IMMEDIATE ATTENTION  Please FAX all test results